# Patient Record
Sex: FEMALE | Race: WHITE | NOT HISPANIC OR LATINO | Employment: OTHER | URBAN - METROPOLITAN AREA
[De-identification: names, ages, dates, MRNs, and addresses within clinical notes are randomized per-mention and may not be internally consistent; named-entity substitution may affect disease eponyms.]

---

## 2017-01-06 ENCOUNTER — PATIENT MESSAGE (OUTPATIENT)
Dept: GASTROENTEROLOGY | Facility: CLINIC | Age: 42
End: 2017-01-06

## 2017-01-06 ENCOUNTER — TELEPHONE (OUTPATIENT)
Dept: GASTROENTEROLOGY | Facility: CLINIC | Age: 42
End: 2017-01-06

## 2017-01-06 DIAGNOSIS — K52.832 LYMPHOCYTIC COLITIS: Primary | ICD-10-CM

## 2017-01-06 RX ORDER — MERCAPTOPURINE 50 MG/1
25 TABLET ORAL 2 TIMES DAILY
Qty: 30 TABLET | Refills: 1 | Status: SHIPPED | OUTPATIENT
Start: 2017-01-06 | End: 2017-04-10

## 2017-01-06 NOTE — TELEPHONE ENCOUNTER
Called and spoke with pt  Explained message below.  She asked if I could mail the lab orders.   I told her yes and I would put a note reminding her to go to the [facility] in [place] 2 wks after she starts the 6mp.  She said that would be great  Thanked me for the call     Lab orders mailed along with a reminder note on when and where to go

## 2017-01-06 NOTE — TELEPHONE ENCOUNTER
----- Message from Jefry Willett MD sent at 1/6/2017 10:04 AM CST -----  Please schedule patient for CBC/LFTs at [facility] 2 weeks after she starts 6MP. Let her know I sent her email with plan based on her email today and 6MP has been sent to  pharmacy in [facility]

## 2017-01-22 ENCOUNTER — PATIENT MESSAGE (OUTPATIENT)
Dept: GASTROENTEROLOGY | Facility: CLINIC | Age: 42
End: 2017-01-22

## 2017-02-01 ENCOUNTER — TELEPHONE (OUTPATIENT)
Dept: GASTROENTEROLOGY | Facility: CLINIC | Age: 42
End: 2017-02-01

## 2017-02-01 NOTE — TELEPHONE ENCOUNTER
Called Griselda at [facility] and I explained the orders are still goo and OK to use.  I explained they are standing orders.  She expressed understanding and thanked me for my call

## 2017-02-01 NOTE — TELEPHONE ENCOUNTER
----- Message from Chyna Dav sent at 2/1/2017 11:58 AM CST -----  Contact: Griselda- Anu- [phone]  Chilo Villalobos called to get clarity on the pts orders for her cbc and hepatic function panel test- says they are from November and wanted to know if it was ok to still use them- please call Griselda back at [phone]

## 2017-02-02 ENCOUNTER — TELEPHONE (OUTPATIENT)
Dept: GASTROENTEROLOGY | Facility: CLINIC | Age: 42
End: 2017-02-02

## 2017-02-02 NOTE — TELEPHONE ENCOUNTER
.Outside Labs: [facility]    Date of labs:  2/1/17  Results: other lab results will be coming - per pt    Prealbumin 19      IBD MEDICATIONS CURRENTLY ON AND CURRENT DOSAGE (including Prednisone):  entocort 9mg po daily  Prednisone 10 mg po daily  6mp 25mg po bis      IF CURRENTLY ON A BIOLOGIC, LAST DOSE AND NEXT SCHEDULED DOSE:na    NEXT SCHEDULED APPOINTMENT: 4/10

## 2017-02-06 ENCOUNTER — TELEPHONE (OUTPATIENT)
Dept: GASTROENTEROLOGY | Facility: CLINIC | Age: 42
End: 2017-02-06

## 2017-02-06 ENCOUNTER — PATIENT MESSAGE (OUTPATIENT)
Dept: GASTROENTEROLOGY | Facility: CLINIC | Age: 42
End: 2017-02-06

## 2017-02-06 NOTE — TELEPHONE ENCOUNTER
----- Message from Jefry Willett MD sent at 2/6/2017  2:01 PM CST -----  Luisa  Can you get bloodwork from [facility] for Ms Byrnes- she said she had it done there last Wednesday but I have not received it    Thanks so much     SS

## 2017-02-07 NOTE — TELEPHONE ENCOUNTER
Emailed patient results and asked her to increase 6MP 25 mg BID with CBC/LFTs in 2 weeks. She will let us know once labs are done and make sure we receive. I don't plan to taper entocort until she is on appropriate dosing for at least 3 mos of 6MP    Next appt 4/10/16    SS

## 2017-02-07 NOTE — TELEPHONE ENCOUNTER
.Outside Labs: [facility]    Date of labs:2/1/17    Results:  Bun 11  Creatinine 0.40L 0.50-1.30  Na 144  k 3.6  Chloride 104  Protein, total 6.2  Albumin 3.4L 3.5-5.2  Bilirubin, total 0.3  Alkaline phos 52  ast 22  Alt 19    Wbc 15.3H 4.0-11.0  Rbc 4.09  hgb 12.2  hct 36.9  mcv 90.2  Mch 29.8  Mchc 33.1  Neutrophils 79.4H 40.0-74.0  Bands 10.3H 0.0-8.0  Lymphocytes 6.8L 19.0-48.0  Monocytes 2.5L 4.0-13.0  Nucleated rbc's 1H 0/100wbc  plt 313          IBD MEDICATIONS CURRENTLY ON AND CURRENT DOSAGE (including Prednisone):  Prednisone 10 mg po daily    IF CURRENTLY ON A BIOLOGIC, LAST DOSE AND NEXT SCHEDULED DOSE:  na  NEXT SCHEDULED APPOINTMENT: clinic 4/10

## 2017-02-09 ENCOUNTER — TELEPHONE (OUTPATIENT)
Dept: GASTROENTEROLOGY | Facility: CLINIC | Age: 42
End: 2017-02-09

## 2017-02-09 ENCOUNTER — PATIENT MESSAGE (OUTPATIENT)
Dept: GASTROENTEROLOGY | Facility: CLINIC | Age: 42
End: 2017-02-09

## 2017-02-22 ENCOUNTER — PATIENT MESSAGE (OUTPATIENT)
Dept: GASTROENTEROLOGY | Facility: CLINIC | Age: 42
End: 2017-02-22

## 2017-03-10 ENCOUNTER — TELEPHONE (OUTPATIENT)
Dept: GASTROENTEROLOGY | Facility: CLINIC | Age: 42
End: 2017-03-10

## 2017-03-10 NOTE — TELEPHONE ENCOUNTER
----- Message from Cristina Velairena sent at 3/10/2017  8:34 AM CST -----  Contact: my chart  Appointment Request From: Jewels Byrnes         With Provider: Jefry Willett MD [[facility]]        Would Accept With:Only the person I've selected        Preferred Date Range: From 4/12/2017 To 5/24/2017        Preferred Times: Wednesday Morning, Friday Morning, Wednesday Afternoon, Friday Afternoon        Reason for visit: Request an Appt        Comments:    I am scheduled for my follow up visit on a Monday in April. Do you have any appointments on a Wednesday or Friday as those are my days off of work? I can come at any time on a Wed or Friday (except the last week in April). Looking for an appointment in either April or May.        Thank you    Jewels Byrnes

## 2017-03-10 NOTE — TELEPHONE ENCOUNTER
Returned pts call  Left ms that dr montano only has  Clinic on mon, tue and thurs.   She is currently has 4/10 set  please advise if she doesn't want that appt  Direct line left

## 2017-04-10 ENCOUNTER — OFFICE VISIT (OUTPATIENT)
Dept: GASTROENTEROLOGY | Facility: CLINIC | Age: 42
End: 2017-04-10
Payer: MEDICARE

## 2017-04-10 VITALS
TEMPERATURE: 98 F | DIASTOLIC BLOOD PRESSURE: 66 MMHG | HEIGHT: 62 IN | BODY MASS INDEX: 18.78 KG/M2 | WEIGHT: 102.06 LBS | HEART RATE: 80 BPM | SYSTOLIC BLOOD PRESSURE: 94 MMHG | RESPIRATION RATE: 14 BRPM

## 2017-04-10 DIAGNOSIS — R10.9 ABDOMINAL CRAMPING: Primary | ICD-10-CM

## 2017-04-10 PROCEDURE — 99214 OFFICE O/P EST MOD 30 MIN: CPT | Mod: S$GLB,,, | Performed by: INTERNAL MEDICINE

## 2017-04-10 PROCEDURE — 1160F RVW MEDS BY RX/DR IN RCRD: CPT | Mod: S$GLB,,, | Performed by: INTERNAL MEDICINE

## 2017-04-10 PROCEDURE — 99999 PR PBB SHADOW E&M-EST. PATIENT-LVL IV: CPT | Mod: PBBFAC,,, | Performed by: INTERNAL MEDICINE

## 2017-04-10 RX ORDER — TRAMADOL HYDROCHLORIDE 50 MG/1
50 TABLET ORAL
COMMUNITY
End: 2021-01-01

## 2017-04-10 RX ORDER — DICYCLOMINE HYDROCHLORIDE 10 MG/1
10 CAPSULE ORAL
Qty: 60 CAPSULE | Refills: 3 | Status: SHIPPED | OUTPATIENT
Start: 2017-04-10 | End: 2017-05-10

## 2017-04-10 NOTE — MR AVS SNAPSHOT
[facility]  [address]  [place] [address]  Phone: [phone]  Fax: [phone]                  Jewels Byrnes   4/10/2017 3:00 PM   Office Visit    Description:  Female : 1975   Provider:  Jefry Willett MD   Department:  [facility]           Reason for Visit     lymphocytic colitis           Diagnoses this Visit        Comments    Abdominal cramping    -  Primary            To Do List           Goals (5 Years of Data)     None       These Medications        Disp Refills Start End    dicyclomine (BENTYL) 10 MG capsule 60 capsule 3 4/10/2017 5/10/2017    Take 1 capsule (10 mg total) by mouth 4 (four) times daily before meals and nightly. - Oral    Pharmacy: [facility] [place] [address] [address]  #: [phone]         [facility]     [facility] On Call Nurse Care Line -  Assistance  Unless otherwise directed by your provider, please contact [facility], our nurse care line that is available for  assistance.     Registered nurses in the [facility] provide: appointment scheduling, clinical advisement, health education, and other advisory services.  Call: [phone] (toll free)               Medications           Message regarding Medications     Verify the changes and/or additions to your medication regime listed below are the same as discussed with your clinician today.  If any of these changes or additions are incorrect, please notify your healthcare provider.        START taking these NEW medications        Refills    dicyclomine (BENTYL) 10 MG capsule 3    Sig: Take 1 capsule (10 mg total) by mouth 4 (four) times daily before meals and nightly.    Class: Normal    Route: Oral      STOP taking these medications     lidocaine-prilocaine (EMLA) cream APPLY A SMALL AMOUNT TOPICALLY AS NEEDED BEFORE PORT ACCESS    mercaptopurine (PURINETHOL) 50 mg tablet Take 0.5 tablets (25 mg total) by mouth 2 (two) times  "daily.           Verify that the below list of medications is an accurate representation of the medications you are currently taking.  If none reported, the list may be blank. If incorrect, please contact your healthcare provider. Carry this list with you in case of emergency.           Current Medications     atenolol (TENORMIN) 25 MG tablet Take 25 mg by mouth daily as needed. Only if needed for tachycardia    budesonide (ENTOCORT EC) 3 mg capsule Take 9 mg by mouth once daily. Takes 1 - 2 caps a day    ondansetron (ZOFRAN) 8 MG tablet Take 8 mg by mouth every 8 (eight) hours.    predniSONE (DELTASONE) 10 MG tablet Take 10 mg by mouth once daily. Use dose in AM prior to surgery    senna-docusate 8.6-50 mg (PERICOLACE) 8.6-50 mg per tablet Take 2 tablets by mouth daily as needed.     sildenafil (REVATIO) 20 mg tablet Take 20 mg by mouth every evening.     tramadol (ULTRAM) 50 mg tablet Take 50 mg by mouth every 6 (six) hours as needed for Pain.    dicyclomine (BENTYL) 10 MG capsule Take 1 capsule (10 mg total) by mouth 4 (four) times daily before meals and nightly.           Clinical Reference Information           Your Vitals Were     BP Pulse Temp Resp Height Weight    94/66 (BP Location: Left arm, Patient Position: Sitting) 80 98.2 °F (36.8 °C) 14 5' 2" (1.575 m) 46.3 kg (102 lb 1.2 oz)    BMI                18.67 kg/m2          Blood Pressure          Most Recent Value    BP  94/66      Allergies as of 4/10/2017     Sulfa (Sulfonamide Antibiotics)    Gluten Protein    Latex, Natural Rubber    Dairy Aid [Lactase]      Immunizations Administered on Date of Encounter - 4/10/2017     None      Instructions    Instructions:  - take bentyl 10 mg 30 min prior to lunch and again 30 min prior to dinner  - continue entocort 6 mg daily  - follow-up with Dr. Connors in 3 months        Language Assistance Services     ATTENTION: Language assistance services are available, free of charge. Please call [phone].  "     ATENCIÓN: Si habla español, tiene a telles disposición servicios gratuitos de asistencia lingüística. Romi al [phone].     CHÚ Ý: N?u b?n nói Ti?ng Vi?t, có các d?ch v? h? tr? ngôn ng? mi?n phí dành cho b?n. G?i s? [phone].         [facility] complies with applicable Federal civil rights laws and does not discriminate on the basis of race, color, national origin, age, disability, or sex.

## 2017-04-10 NOTE — LETTER
April 10, 2017        Yonatan Connors MD  [address] [facility] [address]  [place] [facility] [place] [address]             [facility]  [address]  [facility] [address]  Phone: [phone]  Fax: [phone]   Patient: Jewels Byrnes   MR Number: [id number]   YOB: 1975   Date of Visit: 4/10/2017       Dear Dr. Connors:    Thank you for referring Jewels Byrnes to me for evaluation. Attached you will find relevant portions of my assessment and plan of care.    If you have questions, please do not hesitate to call me. I look forward to following Jewels Byrnes along with you.    Sincerely,      Jefry Willett MD            CC  Chung Jeter MD    Enclosure

## 2017-04-10 NOTE — PROGRESS NOTES
"     [facility]             Inflammatory Bowel Disease Follow-up  Note            Dr. Jefry Willett  TODAY'S VISIT DATE:  4/10/2017    Chief Complaint:   Chief Complaint   Patient presents with    lymphocytic colitis     PCP: Chung Jeter    Previous History:  Jewels Byrnes is a 41 y.o. 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  She has had GI symptoms since 2005 with flex sig and colonoscopy showing findings suggestive of microscopic colitis and possible celiac disease.  She was not told about this diagnosis initially but later felt better when she tried a gluten free diet in 2012.  She was admitted to the hospital in 2006 for liver failure at which time she was thought to have  autoimmune pancreatitis with autoimmune hepatitis which was treated with IV steroids, 6MP and she had a cholecystectomy.  She did well on 6MP for about 5 years.  Repeat EGD in 2009 and 2011 showed normal duodenum.  She was hospitalized in approximately 0378-1069 for severe pulmonary HTN requiring treatment with cytoxan and her GI symptoms were manageable as the other symptoms including body pains were most significant. These symptoms improved in 2012 with gluten free diet.  Intermittently through the years she was given antibiotics for her GI symptoms which would work and usually doxycycline and flagyl. Colonoscopy 11/2012 showed tubular adenomas with colon biopsies showing reduction of goblet cells and increased IELS with some concerns for "autoimmune colitis".  She was treated for SIBO with rifaximin which helped. She had 2 episodes of C diff and after her 2nd episode (though this was not resistant to treatment) it was " suggested she should undergo fecal transplant. Though after this she had massive candida overgrowth and H pylori (self treated) and food intolerances.  Thereafter she tried entocort (effective), uceris (less effective), 5-ASA (hypersensivity reaction), imuran (severe vomiting?pancreatitis, though tolerated 6MP in past). She had taken prednisone for her scleroderma and when she initially saw me end of June 2016 she was on prednisone 20 mg daily for severe oral ulcers.  She was on TPN since 2/2016.  She took  marinol and for constipation takes sennokot for last 3 mos.  She reported significant RLQ abdominal pain and intermittent diarrhea which was better with entocort and marinol.  At the time she initially met me I wanted to assess for IBD, microscopic colitis, autoimmune enteropathy and celiac disease with testing though I though a large part of her symptoms were due to  systemic sclerosis which was contributing to her constipation, h/o SIBO and even collagen deposition on colon biopsies.  In June 2016 MRE showed some possible mild mural thickening of the cecum and proximal ascending colon. EGD and colonoscopy in 7/2016 showed normal EGD with biopsies negative for celiac disease and colonoscopy normal endoscopically with biopsies consistent with microscopic lymphocytic colitis. We did [place] anti-enterocyte abs which were negative.  During summer 2016, she increased entocort from 3 mg daily to 9 mg daily and after 2 mos her diarrhea resolved.  She has been off of TPN since end of 9/2016    Interval History:  - current IBD meds: entocort 6 mg daily  - 2/7/17 port removed due to infection  - tried 6MP 25 mg daily but when we increased to 50 mg daily she developed diarrhea, fatigue, exhaustion, nausea  - abdominal pain: daily, intermittent, valium helped, bentyl and tramadol help  - nausea with certain foods- takes zofran  - anxious/depressed  - 3/29/17 labs:  CMP normal with albumin 3.4, iron 110, iron sats 40%, CBC  normal (H/H 13.4/42.3)  - joint pains  - sleep disturbance- attributed to too much caffeine  - 1 soft to formed Bowel Movements/day    Prior Pertinent Endoscopy/Imaging:  3/31/2005 Flex Sig: colonic mucosa up to the distal transverse colon normal- biopsies of left and transverse colon show increased collagen and lymphocytic infiltrates are suggestive of a collagenous colitis; lymphocytic colitis must also be considered; rectum: mucosa with lymphocytic inflammation and focal superficial erosion  4/12/2005 Colonscopy: entire colon normal; biopsies of TI normal except some intraepithelial lymphocytosis and normal villous architecture; ascending/transverse/descendig/sigmoid colon normal with rectum with focal superificial erosion  5/3/2005 EGD: esophagus normal; non-bleeding erythematous gastropathy; normal examined duodenum  5/3/2005 Pathology EGD: duodenum mucosa with increased intraepithelial lymphocytes; stomach: chronic inactive gastritis, negative for helicobactor; changes present in the duodenum are suggestive of celiac sprue??  12/2/2009 Pathology EGD: Duodenum unremarkable; stomach-mild reactive gastropathy; gastroesophageal junction: lymphocytic esophagitis pattern of injury  10/3/2011 EGD: tortuous esophagus; normal stomach; erythematous duodenopathy; biopsies of duodenal mucosa partially denuded; candida esophagitis (PAS positive)  11/7/2012 Colonoscopy: Two 3-4 mm polyps in cecum resected (tubular adenoma) with adjacent mucosa with marked reduction of goblet cells and increased IELs; ileal mucosa with abscence of goblet and paneth cells; random colon mucosa iwht marked reduction of goblet cells, crypt apoptosis, increase intraepithelial lymphocytes though no inflammation; pseudopyloric metaplasia or epithelioid granulomas identified; findings most likely represent autoimmune colitis; confirmatory antienterocyte and antigoblet cell studies are recommended  7/11/2013 Colonoscopy: normal colon; terminal ileum  normal; random colon biopsies compatible with autoimmune colitis  5/19/2015 Colonoscopy with biopsy and fecal transplantation: mild decreased vascular patterning throughout colon, particularly on the left side. No overt pseudomembranes or obvious C. Diff colitis on Vancomycin and Flagyl at time of scope). Cecum biopsy normal, ascending colon normal, random distal colon-mild chronic non-specific colitis; Biopsies of cecum/ascending colon normal with random distal colon biopsies showing mild chronic non-specific colitis  1/26/2016 CT Scan: colonic air fluids levels, nonspecific-may reflect ileus or enterocolitis; no colonic mucosal thickening, mild indistinctness of the contour of the head of the pancreas with minimal stranding of the surrounding soft tissues (mild acute pancreatitis)  2/18/2016 CXR: subtle opacity of the right middle lobe concerning for developing pneumonia    Prior Pertinent Labs:  1/26/2016-C. Diff negative  1/14/2016-Stool Cultures negative    Pertinent Labs:  Lab Results   Component Value Date    STOOLCULTURE  07/01/2016     No Salmonella,Shigella,Vibrio,Campylobacter,Yersinia isolated.    YBFINQVNDC2Q Negative 07/01/2016    PZYDRHAKMH6C Negative 07/01/2016    CDIFFICILEAN Negative 07/01/2016    CDIFFTOX Negative 07/01/2016     Lab Results   Component Value Date    CRP 0.2 06/16/2016     No results found for: KHZFUZPL78ES  No results found for: HEPBIGM, HEPBCAB, HEPBSURFABQU  No results found for: VARICELLAZOS, VARICELLAINT  No results found for: NIL, TBAG, TBAGNIL, MITOGENNIL, TBGOLD  Lab Results   Component Value Date    TPMTRESULT 23.9 (L) 06/16/2016     No results found for: ANSADAINIT, INFLIXIMAB, INFLIXINTERP    Prior treatments for autoimmune disease/GI issues  Sulfasalazine- ineffective  apriso, pentasa, asacol- hypersensitivity reaction  Entocort- effective  Uceris- ineffective  Prednisone- effective  6MP- effective for 5 years, retrial- diarrhea, fatigue, exhaustion, nausea  Imuran-  ?pancreatitis  Antibiotics- effective     Current Therapies:  Prednisone 10 mg daily  Entocort 6 mg daily    NSAID use/indication:  No    Narcotic use:  No    Alternative/Complementary Meds for IBD:  Tumeric, hemp oil      Review of Systems   Constitutional: Negative for chills, diaphoresis, fever and weight loss.        Weight gain   HENT:        No oral ulcers, dysphagia, oral thrush   Eyes: Negative for blurred vision, pain and redness.   Respiratory: Negative for cough and shortness of breath.    Cardiovascular: Negative for chest pain.   Gastrointestinal: Positive for abdominal pain. Negative for blood in stool, constipation, diarrhea, heartburn, melena, nausea and vomiting.   Genitourinary: Negative for dysuria and hematuria.   Musculoskeletal: Positive for joint pain (joint stiffness). Negative for back pain.   Skin: Negative for rash.   Endo/Heme/Allergies: Does not bruise/bleed easily.   Psychiatric/Behavioral: Positive for depression. The patient is nervous/anxious. The patient does not have insomnia.      All Medical History/Surgical History/Family History/Social History/Allergies have been reviewed and updated in EMR    Review of patient's allergies indicates:   Allergen Reactions    Sulfa (sulfonamide antibiotics) Anaphylaxis    Gluten protein Other (See Comments)     Celiac and crohn's     Latex, natural rubber Hives    Dairy aid [lactase] Rash       Outpatient Prescriptions Marked as Taking for the 4/10/17 encounter (Office Visit) with Jefry Willett MD   Medication Sig Dispense Refill    atenolol (TENORMIN) 25 MG tablet Take 25 mg by mouth daily as needed. Only if needed for tachycardia      budesonide (ENTOCORT EC) 3 mg capsule Take 9 mg by mouth once daily. Takes 1 - 2 caps a day      ondansetron (ZOFRAN) 8 MG tablet Take 8 mg by mouth every 8 (eight) hours.      predniSONE (DELTASONE) 10 MG tablet Take 10 mg by mouth once daily. Use dose in AM prior to surgery      senna-docusate 8.6-50  mg (PERICOLACE) 8.6-50 mg per tablet Take 2 tablets by mouth daily as needed.       sildenafil (REVATIO) 20 mg tablet Take 20 mg by mouth every evening.       tramadol (ULTRAM) 50 mg tablet Take 50 mg by mouth every 6 (six) hours as needed for Pain.         Vital Signs:  BP: 94/66 Temp: 98.2 °F (36.8 °C) Pulse: 80 (ox:98) Resp: 14  Weight: 46.3 kg (102 lb 1.2 oz)    Physical Exam   Constitutional: She is oriented to person, place, and time. She appears well-developed.   HENT:   Mouth/Throat: Oropharynx is clear and moist. No oral lesions.   Eyes: Conjunctivae are normal. Pupils are equal, round, and reactive to light.   Neck:   Left upper chest- port in place   Cardiovascular: Normal rate and regular rhythm.    Pulmonary/Chest: Effort normal and breath sounds normal.   Abdominal: Soft. There is no tenderness.   Neurological: She is alert and oriented to person, place, and time.   Skin: No rash noted.   Psychiatric: She has a normal mood and affect.   Nursing note and vitals reviewed.      Labs:  Lab Results   Component Value Date    STOOLCULTURE  07/01/2016     No Salmonella,Shigella,Vibrio,Campylobacter,Yersinia isolated.    FOFKISLQLT0D Negative 07/01/2016    FGBJBOVOSI5K Negative 07/01/2016    CDIFFICILEAN Negative 07/01/2016    CDIFFTOX Negative 07/01/2016     Lab Results   Component Value Date    WBC 8.90 06/23/2016    HGB 12.1 06/23/2016    HCT 37.5 06/23/2016    MCV 98 06/23/2016     06/23/2016    ALT 53 (H) 06/23/2016    AST 39 (H) 06/23/2016    ALKPHOS 46 06/23/2016    BILITOT 0.4 06/23/2016    TSH 0.467 06/16/2016    FREET4 0.90 06/16/2016    SEDRATE 4 06/16/2016    CRP 0.2 06/16/2016    TTGIGA 7 06/16/2016     06/16/2016     No results found for: HEPBIGM, HEPBCAB, HEPBSAB, HEPBSURFABQU  No results found for: VARICELLAZOS, VARICELLAINT  No results found for: NIL, TBAG, TBAGNIL, MITOGENNIL, TBGOLD  Lab Results   Component Value Date    TPMTRESULT 23.9 (L) 06/16/2016     No results found for:  ANSADAINIT, INFLIXIMAB, INFLIXINTERP    Assessment/Plan:  Jewels Byrnes is a 41 y.o. 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 I have done a thorough evaluation and no evidence of Crohn's disease, autoimmune enteropathy, thyroid disease or celiac disease.  She has had an incredible effect from increasing her dose of entocort and now on 6 mg daily. Her symptoms are controlled with no diarrhea and she has been able to get off of TPN since 9/2016 and gained weight.  She had an infected port which was removed in 2/2017.  She continues on prednisone 10 mg daily in addition to entocort 6 mg daily. Overall I don't think risks or benefits of biologics for microscopic colitis will allow her to get off of her steroids.  I also think the risk of long term TPN compared to low dose prednisone is worse and her QOL is very important.  WE had a detailed discussion regarding long term risks of entocort and prednisone.  She failed a trial for 6MP and had significant SE to this    # Scleroderma with systemic sclerosis manifesting with GI symptoms  # Lymphocytic colitis  # Malnutrition- improved    Plan:  - continue entocort 6 mg daily and prednisone 10 mg daily  - pt understands risk of long term corticosteroids  - bone health- sees PCP/endocrinology- weight bearing exercise, calcium/vit D    Total visit time was 30 minutes, more than 50% of which was spent in face-to-face counseling with patient regarding evaluation and management goals and treatment options for lymphocytic colitis     Follow up: with Dr. Connors in 3 mos    Jefry Willett MD  Department of Gastroenterology  Medical Director, Inflammatory Bowel Disease

## 2017-04-14 DIAGNOSIS — K52.832 LYMPHOCYTIC COLITIS: ICD-10-CM

## 2017-04-16 RX ORDER — BUDESONIDE 3 MG/1
CAPSULE, COATED PELLETS ORAL
Qty: 60 CAPSULE | Refills: 3 | Status: SHIPPED | OUTPATIENT
Start: 2017-04-16 | End: 2018-12-04

## 2017-05-22 ENCOUNTER — PATIENT MESSAGE (OUTPATIENT)
Dept: GASTROENTEROLOGY | Facility: CLINIC | Age: 42
End: 2017-05-22

## 2017-06-14 ENCOUNTER — PATIENT MESSAGE (OUTPATIENT)
Dept: GASTROENTEROLOGY | Facility: CLINIC | Age: 42
End: 2017-06-14

## 2017-08-07 DIAGNOSIS — K52.832 LYMPHOCYTIC COLITIS: ICD-10-CM

## 2017-08-07 RX ORDER — BUDESONIDE 3 MG/1
CAPSULE, COATED PELLETS ORAL
Qty: 60 CAPSULE | Refills: 3 | OUTPATIENT
Start: 2017-08-07

## 2017-10-20 ENCOUNTER — TELEPHONE (OUTPATIENT)
Dept: NEUROSURGERY | Facility: CLINIC | Age: 42
End: 2017-10-20

## 2017-10-20 NOTE — TELEPHONE ENCOUNTER
Left message on patient voicemail with appointment date and time , also mailed out appointment letter

## 2018-07-19 ENCOUNTER — TELEPHONE (OUTPATIENT)
Dept: GASTROENTEROLOGY | Facility: CLINIC | Age: 43
End: 2018-07-19

## 2018-07-19 NOTE — TELEPHONE ENCOUNTER
Pls arrange new pt apt w me and victorino.  Ref from Dr. Vicente Watts.  PT w GI scleroderma. Previously seen by Dr. Willett.  Some records in media.  Pls get any new records (since apt w Dr. Willett) from primary GI.

## 2018-08-07 NOTE — TELEPHONE ENCOUNTER
Attempted to reach pt to obtain additional information in order to schedule with Dr. Watts.  No answer from patient.  Message left on voicemail for patient to return call to office.

## 2018-08-30 ENCOUNTER — TELEPHONE (OUTPATIENT)
Dept: GASTROENTEROLOGY | Facility: CLINIC | Age: 43
End: 2018-08-30

## 2018-08-30 NOTE — TELEPHONE ENCOUNTER
----- Message from Chyna Demarco sent at 8/29/2018  8:49 AM CDT -----  Contact: Self- [phone]  Arnulfomi- pt called to schedule an ep appt- previously seen by Dr. KRUPA Willett- would like to rule out chron's pt has scleroderma please contact pt at [phone]

## 2018-08-31 ENCOUNTER — TELEPHONE (OUTPATIENT)
Dept: GASTROENTEROLOGY | Facility: CLINIC | Age: 43
End: 2018-08-31

## 2018-08-31 NOTE — TELEPHONE ENCOUNTER
----- Message from Chyna Demarco sent at 8/31/2018  9:54 AM CDT -----  Contact: Self- [phone]  Stefanie- pt is returning a call in regards to scheduling an appt with Dr. Watts- please contact pt at [phone]

## 2018-09-12 ENCOUNTER — TELEPHONE (OUTPATIENT)
Dept: GASTROENTEROLOGY | Facility: CLINIC | Age: 43
End: 2018-09-12

## 2018-09-13 ENCOUNTER — TELEPHONE (OUTPATIENT)
Dept: GASTROENTEROLOGY | Facility: CLINIC | Age: 43
End: 2018-09-13

## 2018-09-13 NOTE — TELEPHONE ENCOUNTER
----- Message from Chyna Demarco sent at 9/13/2018  9:57 AM CDT -----  Contact: Self- [phone]  Romina luna is returning a missed call from Radha- please contact pt at [phone]

## 2018-09-24 ENCOUNTER — OFFICE VISIT (OUTPATIENT)
Dept: URGENT CARE | Facility: CLINIC | Age: 43
End: 2018-09-24
Payer: MEDICARE

## 2018-09-24 VITALS
DIASTOLIC BLOOD PRESSURE: 67 MMHG | RESPIRATION RATE: 16 BRPM | TEMPERATURE: 97 F | OXYGEN SATURATION: 97 % | HEIGHT: 62 IN | HEART RATE: 88 BPM | BODY MASS INDEX: 15.27 KG/M2 | WEIGHT: 83 LBS | SYSTOLIC BLOOD PRESSURE: 92 MMHG

## 2018-09-24 DIAGNOSIS — K12.1 MOUTH ULCERS: ICD-10-CM

## 2018-09-24 DIAGNOSIS — H66.005 RECURRENT ACUTE SUPPURATIVE OTITIS MEDIA WITHOUT SPONTANEOUS RUPTURE OF LEFT TYMPANIC MEMBRANE: Primary | ICD-10-CM

## 2018-09-24 DIAGNOSIS — J02.9 SORE THROAT: ICD-10-CM

## 2018-09-24 LAB
CTP QC/QA: YES
S PYO RRNA THROAT QL PROBE: NEGATIVE

## 2018-09-24 PROCEDURE — 87880 STREP A ASSAY W/OPTIC: CPT | Mod: QW,S$GLB,, | Performed by: PHYSICIAN ASSISTANT

## 2018-09-24 PROCEDURE — 3008F BODY MASS INDEX DOCD: CPT | Mod: CPTII,S$GLB,, | Performed by: PHYSICIAN ASSISTANT

## 2018-09-24 PROCEDURE — 99204 OFFICE O/P NEW MOD 45 MIN: CPT | Mod: S$GLB,,, | Performed by: PHYSICIAN ASSISTANT

## 2018-09-24 RX ORDER — TIZANIDINE 4 MG/1
TABLET ORAL
COMMUNITY
Start: 2018-08-21 | End: 2018-12-04

## 2018-09-24 RX ORDER — POTASSIUM CHLORIDE 1500 MG/1
TABLET, EXTENDED RELEASE ORAL
COMMUNITY
Start: 2018-09-07 | End: 2018-12-04

## 2018-09-24 RX ORDER — AMOXICILLIN 500 MG/1
500 CAPSULE ORAL EVERY 12 HOURS
Qty: 20 CAPSULE | Refills: 0 | Status: SHIPPED | OUTPATIENT
Start: 2018-09-24 | End: 2018-10-04

## 2018-09-24 RX ORDER — HYDROCODONE BITARTRATE AND ACETAMINOPHEN 5; 325 MG/1; MG/1
TABLET ORAL
Refills: 0 | COMMUNITY
Start: 2018-08-20 | End: 2018-12-04

## 2018-09-24 RX ORDER — ONDANSETRON 4 MG/1
4 TABLET, ORALLY DISINTEGRATING ORAL
Refills: 0 | COMMUNITY
Start: 2018-06-26 | End: 2021-01-01

## 2018-09-24 RX ORDER — DIAZEPAM 5 MG/1
TABLET ORAL
COMMUNITY
Start: 2018-09-06 | End: 2021-01-01 | Stop reason: CLARIF

## 2018-09-24 NOTE — PROGRESS NOTES
"Subjective:       Patient ID: Jewels Byrnes is a 43 y.o. female.    Vitals:    09/24/18 1509   BP: 92/67   Pulse: 88   Resp: 16   Temp: 97.1 °F (36.2 °C)   SpO2: 97%   Weight: 37.6 kg (83 lb)   Height: 5' 2" (1.575 m)       Chief Complaint: Sore Throat    Patient reports sore throat for 3 days.      Sore Throat    This is a new problem. Episode onset: 3 days. The problem has been gradually worsening. The pain is worse on the left side. There has been no fever. Associated symptoms include ear pain (left), a plugged ear sensation and trouble swallowing. Pertinent negatives include no abdominal pain, congestion, coughing, headaches, hoarse voice or shortness of breath. She has had no exposure to strep or mono. Treatments tried: gentil violet. The treatment provided no relief.     Review of Systems   Constitution: Positive for malaise/fatigue. Negative for chills and fever.        Body aches   HENT: Positive for ear pain (left), sore throat and trouble swallowing. Negative for congestion and hoarse voice.         Mouth ulcers   Eyes: Negative for discharge and redness.   Cardiovascular: Positive for palpitations. Negative for chest pain, dyspnea on exertion and leg swelling.   Respiratory: Negative for cough, shortness of breath, sputum production and wheezing.    Musculoskeletal: Negative for myalgias.   Gastrointestinal: Negative for abdominal pain and nausea.   Neurological: Negative for headaches.       Objective:      Physical Exam   Constitutional: She is oriented to person, place, and time. She appears well-developed and well-nourished. She is cooperative.  Non-toxic appearance. She does not appear ill. No distress.   HENT:   Head: Normocephalic and atraumatic.   Right Ear: Hearing, tympanic membrane, external ear and ear canal normal.   Left Ear: Hearing, external ear and ear canal normal. Tympanic membrane is erythematous and bulging.   Nose: Nose normal. No mucosal edema, rhinorrhea or nasal deformity. No " Signed, please fax and send for scanning.  Placed in TC/MA file.  Electronically signed by Yumiko Ralph M.D.     epistaxis. Right sinus exhibits no maxillary sinus tenderness and no frontal sinus tenderness. Left sinus exhibits no maxillary sinus tenderness and no frontal sinus tenderness.   Mouth/Throat: Uvula is midline and mucous membranes are normal. Oral lesions (multiple ulcers on the tongue noted) present. No trismus in the jaw. Normal dentition. No uvula swelling. Posterior oropharyngeal erythema present.   Eyes: Conjunctivae and lids are normal. No scleral icterus.   Sclera clear bilat   Neck: Trachea normal, full passive range of motion without pain and phonation normal. Neck supple.   Cardiovascular: Normal rate, regular rhythm, normal heart sounds, intact distal pulses and normal pulses.   Pulmonary/Chest: Effort normal and breath sounds normal. No respiratory distress. She has no wheezes.   Abdominal: Soft. Normal appearance and bowel sounds are normal. She exhibits no distension. There is no tenderness.   Musculoskeletal: Normal range of motion. She exhibits no edema or deformity.   Neurological: She is alert and oriented to person, place, and time. She exhibits normal muscle tone. Coordination normal.   Skin: Skin is warm, dry and intact. She is not diaphoretic. No pallor.   Psychiatric: She has a normal mood and affect. Her speech is normal and behavior is normal. Judgment and thought content normal. Cognition and memory are normal.   Nursing note and vitals reviewed.      Assessment:       1. Recurrent acute suppurative otitis media without spontaneous rupture of left tympanic membrane    2. Sore throat    3. Mouth ulcers        Plan:       Jewels was seen today for sore throat.    Diagnoses and all orders for this visit:    Recurrent acute suppurative otitis media without spontaneous rupture of left tympanic membrane  -     amoxicillin (AMOXIL) 500 MG capsule; Take 1 capsule (500 mg total) by mouth every 12 (twelve) hours. for 10 days    Sore throat  -     POCT rapid strep A (negative)    Mouth ulcers  -      (Magic mouthwash) 1:1:1 Benadryl 12.5mg/5ml liq, aluminum & magnesium hydroxide-simehticone (Maalox), lidocaine viscous 2%; Swish and spit 5 mLs every 4 (four) hours as needed.      Other orders        Sore throat likely referred pain from left OM. Patient is already on steroids.  I discussed with the patient the importance of follow up if their symptoms have not resolved in 1-2 week's time. Patient verbalized understanding and will RTC or go to the nearest ER if symptoms persist or worsen.

## 2018-10-03 ENCOUNTER — OFFICE VISIT (OUTPATIENT)
Dept: PAIN MEDICINE | Facility: CLINIC | Age: 43
End: 2018-10-03
Attending: ANESTHESIOLOGY
Payer: MEDICARE

## 2018-10-03 DIAGNOSIS — M79.10 MYALGIA: Primary | ICD-10-CM

## 2018-10-03 DIAGNOSIS — M62.838 CERVICAL PARASPINAL MUSCLE SPASM: ICD-10-CM

## 2018-10-03 DIAGNOSIS — G89.4 CHRONIC PAIN DISORDER: ICD-10-CM

## 2018-10-03 DIAGNOSIS — M47.22 OSTEOARTHRITIS OF SPINE WITH RADICULOPATHY, CERVICAL REGION: ICD-10-CM

## 2018-10-03 DIAGNOSIS — M50.30 DDD (DEGENERATIVE DISC DISEASE), CERVICAL: ICD-10-CM

## 2018-10-03 PROCEDURE — 99212 OFFICE O/P EST SF 10 MIN: CPT | Mod: PBBFAC | Performed by: ANESTHESIOLOGY

## 2018-10-03 PROCEDURE — 99204 OFFICE O/P NEW MOD 45 MIN: CPT | Mod: 25,S$PBB,, | Performed by: ANESTHESIOLOGY

## 2018-10-03 PROCEDURE — 20552 NJX 1/MLT TRIGGER POINT 1/2: CPT | Mod: S$PBB,,, | Performed by: ANESTHESIOLOGY

## 2018-10-03 PROCEDURE — 99999 PR PBB SHADOW E&M-EST. PATIENT-LVL II: CPT | Mod: PBBFAC,,, | Performed by: ANESTHESIOLOGY

## 2018-10-03 PROCEDURE — 20552 NJX 1/MLT TRIGGER POINT 1/2: CPT | Mod: PBBFAC | Performed by: ANESTHESIOLOGY

## 2018-10-03 RX ORDER — METHYLPREDNISOLONE ACETATE 40 MG/ML
40 INJECTION, SUSPENSION INTRA-ARTICULAR; INTRALESIONAL; INTRAMUSCULAR; SOFT TISSUE ONCE
Status: COMPLETED | OUTPATIENT
Start: 2018-10-03 | End: 2018-10-03

## 2018-10-03 RX ORDER — BUPIVACAINE HYDROCHLORIDE 2.5 MG/ML
2 INJECTION, SOLUTION EPIDURAL; INFILTRATION; INTRACAUDAL ONCE
Status: COMPLETED | OUTPATIENT
Start: 2018-10-03 | End: 2018-10-03

## 2018-10-03 RX ADMIN — BUPIVACAINE HYDROCHLORIDE 5 MG: 2.5 INJECTION, SOLUTION EPIDURAL; INFILTRATION; INTRACAUDAL; PERINEURAL at 03:10

## 2018-10-03 RX ADMIN — METHYLPREDNISOLONE ACETATE 40 MG: 40 INJECTION, SUSPENSION INTRA-ARTICULAR; INTRALESIONAL; INTRAMUSCULAR; SOFT TISSUE at 03:10

## 2018-10-03 NOTE — PROGRESS NOTES
Subjective:     Patient ID: Jewels Byrnes is a 43 y.o. female.    Chief Complaint: Pain    Consulted by: Self     Disclaimer: This note was generated using voice recognition software.  There may be a typographical errors that were missed during proofreading.      HPI:    Jewels Byrnes is a 43 y.o. female who presents today with chronic neck pain.  Pain started 5 + years ago.  It is on the left side of her neck.  It is a deep, aching pain that becomes sharp with movement, especially ipsilateral.  This occasionally radiates into her left arm/biceps that causes temporary muscle atrophy.  Stretching, traction, and ice help.  Bending, rotation aggravates.  This pain is described in detail below.    Physical Therapy: Yes    Non-pharmacologic Treatment:     · Ice/Heat: Ice  · TENS: No  · Massage: Yes  · Chiropractic care: No  · Acupuncture: No  · Other: No         Pain Medications:         · Currently taking: tramadol 50 mg daily PRN, Norco 5/325 mg daily PRN    · Has tried in the past:  NSAIDs, but she tried to avoid these due to her other medical conditions, tizanidine 4 mg (caused itching), gabapentin (too sedating)    · Has not tried: NSAIDs, Tylenol, other muscle relaxants (methocarmabol, flexeril), TCAs, SNRIs, topical creams    Blood thinners: None    Interventional Therapies:   · None for her neck  · Left L5 TFESI    Relevant Surgeries: Lumbar laminectomy right sided L5/S1    Affecting sleep? Yes    Affecting daily activities? Yes, she is not able to do ballet    Depressive symptoms? None          · SI/HI? No    Work status: Employed as a PT      Prescription Monitoring Program database:  Reviewed and consistent with medication use as prescribed.    No flowsheet data found.    Opioid Risk Score     None          GENERAL:  No weight loss, malaise or fevers.  HEENT:   No recent changes in vision or hearing  NECK:  Negative for lumps, no difficulty with swallowing.  RESPIRATORY:  Negative for cough, wheezing or  shortness of breath, patient denies any recent URI.  CARDIOVASCULAR:  Negative for chest pain, leg swelling or palpitations.  GI:  Negative for abdominal discomfort, blood in stools or black stools or change in bowel habits.  MUSCULOSKELETAL:  See HPI.  SKIN:  Negative for lesions, rash, and itching.  PSYCH:  No mood disorder or recent psychosocial stressors.  Patients sleep is not disturbed secondary to pain.  HEMATOLOGY/LYMPHOLOGY:  Negative for prolonged bleeding, bruising easily or swollen nodes.  Patient is not currently taking any anti-coagulants  ENDO: No history of diabetes or thyroid dysfunction  NEURO:   No history of headaches, syncope, paralysis, seizures or tremors.  All other reviewed and negative other than HPI.          Past Medical History:   Diagnosis Date    Acute pancreatitis     autoimmune pancreatitis    Anemia     Iron infusion- Dr. Vizcaino    C. difficile colitis 2013    x 2     Celiac disease     Cholelithiasis     gallbladder removed 2006    Colon polyp     Food intolerance     Hepatitis     autoimmune hepatitis     Interstitial lung disease due to connective tissue disease     Pulmonary artery hypertension     Scleroderma        Past Surgical History:   Procedure Laterality Date    APPENDECTOMY  1984    BACK SURGERY  08/2010    COLONOSCOPY  2015    COLONOSCOPY N/A 7/13/2016    Procedure: COLONOSCOPY;  Surgeon: Jefry Willett MD;  Location: [facility] ([address] [place] [address]);  Service: Endoscopy;  Laterality: N/A;    COLONOSCOPY N/A 7/13/2016    Performed by Jefry Willett MD at [facility] (4TH FLR)    ESOPHAGOGASTRODUODENOSCOPY (EGD) N/A 7/13/2016    Performed by Jefry Willett MD at [facility] (4TH FLR)    fecal transplant  2015    gallbladder      HYSTERECTOMY  03/2010    salpingo oophrectomy    [id number]-D-CATH Left 6/29/2016    Performed by Pavan Jose [facility] at [facility] OR    LIVER BIOPSY      PERIPHERALLY INSERTED CENTRAL CATHETER INSERTION      right shoulder surgery       UPPER GASTROINTESTINAL ENDOSCOPY  2015       Review of patient's allergies indicates:   Allergen Reactions    Sulfa (sulfonamide antibiotics) Anaphylaxis    Gluten protein Other (See Comments)     Celiac and crohn's     Latex, natural rubber Hives    Dairy aid [lactase] Rash       Current Outpatient Medications   Medication Sig Dispense Refill    (Magic mouthwash) 1:1:1 Benadryl 12.5mg/5ml liq, aluminum & magnesium hydroxide-simehticone (Maalox), lidocaine viscous 2% Swish and spit 5 mLs every 4 (four) hours as needed. 90 mL 0    amoxicillin (AMOXIL) 500 MG capsule Take 1 capsule (500 mg total) by mouth every 12 (twelve) hours. for 10 days 20 capsule 0    atenolol (TENORMIN) 25 MG tablet Take 25 mg by mouth daily as needed. Only if needed for tachycardia      budesonide (ENTOCORT EC) 3 mg capsule Take 9 mg by mouth once daily. Takes 1 - 2 caps a day      budesonide (ENTOCORT EC) 3 mg capsule TAKE 2 CAPSULES BY MOUTH ONCE EVERY DAY 60 capsule 3    diazePAM (VALIUM) 5 MG tablet       HYDROcodone-acetaminophen (NORCO) 5-325 mg per tablet   0    KLOR-CON M20 20 mEq tablet       ondansetron (ZOFRAN) 8 MG tablet Take 8 mg by mouth every 8 (eight) hours.      ondansetron (ZOFRAN-ODT) 4 MG TbDL   0    predniSONE (DELTASONE) 10 MG tablet Take 10 mg by mouth once daily. Use dose in AM prior to surgery      senna-docusate 8.6-50 mg (PERICOLACE) 8.6-50 mg per tablet Take 2 tablets by mouth daily as needed.       sildenafil (REVATIO) 20 mg tablet Take 20 mg by mouth every evening.       tiZANidine (ZANAFLEX) 4 MG tablet       tramadol (ULTRAM) 50 mg tablet Take 50 mg by mouth every 6 (six) hours as needed for Pain.       No current facility-administered medications for this visit.        Family History   Problem Relation Age of Onset    No Known Problems Mother     Heart disease Father     Cancer Father         prostate    Heart disease Maternal Grandmother     Heart disease Maternal Uncle     Crohn's  disease Cousin     Anesthesia problems Neg Hx     Clotting disorder Neg Hx     Multiple sclerosis Neg Hx     Tuberculosis Neg Hx     Scleroderma Neg Hx     Lymphoma Neg Hx     Lupus Neg Hx        Social History     Socioeconomic History    Marital status: Single     Spouse name: Not on file    Number of children: Not on file    Years of education: Not on file    Highest education level: Not on file   Social Needs    Financial resource strain: Not on file    Food insecurity - worry: Not on file    Food insecurity - inability: Not on file    Transportation needs - medical: Not on file    Transportation needs - non-medical: Not on file   Occupational History    Occupation: physical therapist   Tobacco Use    Smoking status: Never Smoker    Smokeless tobacco: Never Used   Substance and Sexual Activity    Alcohol use: Yes     Comment: 1-2 glass of wine every 1-2 weeks    Drug use: No    Sexual activity: Not on file   Other Topics Concern    Not on file   Social History Narrative    Works as a pt       Objective:     There were no vitals filed for this visit.    GEN:  Well developed, well nourished.  No acute distress.  No pain behavior.  HEENT:  No trauma.  Mucous membranes moist.  Nares patent bilaterally.  PSYCH: Normal affect. Thought content appropriate.  CHEST:  Breathing symmetric.  No audible wheezing.  ABD: Soft, non-distended.  SKIN:  Warm, pink, dry.  No rash on exposed areas.    EXT:  No cyanosis, clubbing, or edema.  No color change or changes in nail or hair growth.  NEURO/MUSCULOSKELETAL:  Fully alert, oriented, and appropriate. Speech normal jan. No cranial nerve deficits.   Gait: Normal.  5/5 motor strength throughout upper extremities.   Sensory: Negative sensory deficit in the upper extremities.   C-Spine:  Decreased ROM with pain on left lateral rotation and side bending. Positive facet loading on the left.  Positive Spurling's on the left in a C5 distribution  TTP over left  cervical facet joints and cervical paraspinal muscles.  Muscle atrophy noted in lateral head of the biceps on the left.      Imaging:      The imaging studies listed below were independently reviewed by me, and I agree with the findings as documented below.     MRI cervical spine dated 10/03/2017    Findings:  4 mm or so treating developmental borderline normal mild cerebellar tonsillar ectopia incidentally noted. No remarkable findings referable to the craniocervical junction or C1 level otherwise.  The odontoid is intact with normal C1-2 alignment.  There is slight reversal of the usual cervical lordosis centered at C4/5 disc level.  Cervical vertebral body heights and disc spaces well preserved.  Generalized mild-moderate cervical disc dehydration.    C2/3 Level:  Moderately dehydrated disc.  Moderately advanced left-sided hypertrophic facet joint arthropathy with associated small joint effusion contributory to moderate to severe range left-sided foraminal stenosis.  See left oblique slice 16, axial images 1-3.  No significant central canal or right-sided foraminal narrowing.    C3/4:  Moderately dehydrated disc.  Slightly asymmetric left posterior lateral disc margin bony ridging or spurring.  Moderate left-sided hypertrophic facet joint arthropathy.  Findings contributory to moderate to severe range left-sided foraminal stenosis of less or prominence.  See left oblique images 16-17, axial places 4-6.  No significant central canal right-sided foraminal narrowing.    C4/5:  Comparatively mild disc dehydration with mm tiny midline focal posterior central disc protrusion.  Moderate left-sided facet joint arthropathy, findings contributory to mild narrowing of the left as compared to the right neural foramen.  See axial places 7-9.    C5/6 level:  Diffuse mild circumferential bulging of a similarly dehydrated disc with contiguous marginal lipping or spurring contributory to mild central canal narrowing with subtotal  effacement of the anterior subarachnoid space and minimal extrinsic impression upon the anterior cord surface.  Asymmetrically prominent the relatively mild left-sided foraminal narrowing.  See axial images 10-14.    C6-7:  Similar diffuse mild circumferential disc bulge of a dehydrated disc with contiguous mild marginal lipping or spurring.  1-2 mm midline focal posterior disc protrusion/subligamentous herniation in combination with mild thickening or hypertrophy of the ligamentum flavum contributory to relatively mild degree central canal stenosis with subtotal effacement of the anterior/posterior subarachnoid space, mild cord deformity and compression, slightly eccentric to the right.  Mild facet joint arthropathy.  Mild foraminal narrowing.  See axial places 15-17.    C7/T1:  Mild disc dehydration.  Mild facet arthropathy.  No significant central canal or foraminal narrowing.  See axial images 18-20.    Impression  · Developmental borderline normal mild cerebellar tonsillar ectopia.  · Multilevel the relatively mild degenerative disc disease, most advanced at C5/6, C6/7, with 1-2 mm midline focal posterior central C6/7 level subligamentous herniation, slightly eccentric to the right.  More subtle tiny midline focal posterior central herniation C4/5 level disc protrusion.  · Multilevel, in particular asymmetrically prominent moderately advanced left-sided C2-3 through C4/5 level hypertrophic facet joint arthropathy.  · Above contributory in particular to asymmetrically prominent moderate to severe range left-sided C2/3, C3/4 level foraminal stenosis, more prominent at C2/C3 level. Relatively mild C5/6, C6/7 level central canal stenosis with associated mild cord deformity, more prominent at the C6-7 level    Assessment:     Encounter Diagnoses   Name Primary?    Chronic pain disorder     Myalgia Yes    Osteoarthritis of spine with radiculopathy, cervical region     DDD (degenerative disc disease), cervical      Cervical paraspinal muscle spasm        Plan:     Diagnoses and all orders for this visit:    Myalgia  -     bupivacaine (PF) 0.25% (2.5 mg/ml) injection 5 mg; Inject 2 mLs (5 mg total) into the muscle once.  -     methylPREDNISolone acetate injection 40 mg; Inject 1 mL (40 mg total) into the muscle once.    Chronic pain disorder    Osteoarthritis of spine with radiculopathy, cervical region    DDD (degenerative disc disease), cervical    Cervical paraspinal muscle spasm         Her pain is consistent with the above.    We discussed the assessment and recommendations.  All available images were reviewed. We discussed the disease process, prognosis, treatment plan, and risks and benefits. The patient is aware of the risks and benefits of the medications being prescribed, common side effects, and proper usage. The following is the plan we agreed on:     1. Trigger point injection today as below  1. If good benefit, repeat p.r.n.  2. If limited or not durable benefit, which schedule for C2-5 medial branch blocks with steroids under fluoro on the left  3. Can consider radiofrequency ablation of future  2. If we were ever to do a cervical HAROLDO, I would send her to neurosurgery first.  3. Continue home medications  4. Continue home exercise program  5. Trial Pennsaid  6. RTC as needed.    Trigger Point Injection:   The procedure was discussed with the patient including complications of damage, bleeding, infection, and failure of pain relief.     All medications, allergies, and relevant histories were reviewed. No recent antibiotics or infections.  A time-out was taken to verify the correct patient, procedure, laterality, and appropriate medications/allergies.    Trigger points were identified by palpation and marked. CHG prep of sites done. A 27-gauge needle was advanced to the point of maximal tenderness, and 3mL of a mixture of 0.25% bupivacaine with Depo-Medrol 40 mg was injected after negative aspiration in a  fanlike distribution. All sites done in the same manner. Patient tolerated the procedure well and without complications. Sites injected included:  Left cervical paraspinal X1 (covering levels C2-5)     The patient tolerated the procedure well and was discharged in excellent condition.    Thank you for allowing me to participate in the care of this patient.   Please do not hesitate to call me at [phone] with any questions or concerns.    Christine Ford MD  10/03/2018     The above plan and management options were discussed at length with patient. Patient is in agreement with the above and verbalized understanding. It will be communicated with the referring physician via electronic record, fax, or mail.

## 2018-10-10 ENCOUNTER — TELEPHONE (OUTPATIENT)
Dept: PAIN MEDICINE | Facility: CLINIC | Age: 43
End: 2018-10-10

## 2018-10-10 DIAGNOSIS — M50.30 DDD (DEGENERATIVE DISC DISEASE), CERVICAL: ICD-10-CM

## 2018-10-10 DIAGNOSIS — M47.22 OSTEOARTHRITIS OF SPINE WITH RADICULOPATHY, CERVICAL REGION: Primary | ICD-10-CM

## 2018-10-10 NOTE — TELEPHONE ENCOUNTER
Please contact her to set up a left C2-5 medial branch block with steroids with IV sedation for 10/26/2018.  Right lateral position.    Thanks!  LW

## 2018-11-28 ENCOUNTER — PATIENT MESSAGE (OUTPATIENT)
Dept: GASTROENTEROLOGY | Facility: CLINIC | Age: 43
End: 2018-11-28

## 2018-12-04 ENCOUNTER — TELEPHONE (OUTPATIENT)
Dept: GASTROENTEROLOGY | Facility: CLINIC | Age: 43
End: 2018-12-04

## 2018-12-04 ENCOUNTER — TELEPHONE (OUTPATIENT)
Dept: ENDOSCOPY | Facility: HOSPITAL | Age: 43
End: 2018-12-04

## 2018-12-04 ENCOUNTER — OFFICE VISIT (OUTPATIENT)
Dept: GASTROENTEROLOGY | Facility: CLINIC | Age: 43
End: 2018-12-04
Payer: MEDICARE

## 2018-12-04 ENCOUNTER — HOSPITAL ENCOUNTER (OUTPATIENT)
Facility: HOSPITAL | Age: 43
Discharge: HOME | End: 2018-12-05
Attending: EMERGENCY MEDICINE | Admitting: HOSPITALIST
Payer: MEDICARE

## 2018-12-04 VITALS
HEIGHT: 62 IN | DIASTOLIC BLOOD PRESSURE: 67 MMHG | BODY MASS INDEX: 14.56 KG/M2 | WEIGHT: 79.13 LBS | HEART RATE: 80 BPM | SYSTOLIC BLOOD PRESSURE: 94 MMHG

## 2018-12-04 DIAGNOSIS — F41.9 ANXIETY AND DEPRESSION: ICD-10-CM

## 2018-12-04 DIAGNOSIS — E87.6 HYPOKALEMIA: ICD-10-CM

## 2018-12-04 DIAGNOSIS — F32.A ANXIETY AND DEPRESSION: ICD-10-CM

## 2018-12-04 DIAGNOSIS — M34.9 SYSTEMIC SCLEROSES: Primary | ICD-10-CM

## 2018-12-04 DIAGNOSIS — R11.0 NAUSEA: ICD-10-CM

## 2018-12-04 DIAGNOSIS — K90.49 FOOD INTOLERANCE: ICD-10-CM

## 2018-12-04 DIAGNOSIS — R19.7 DIARRHEA, UNSPECIFIED TYPE: ICD-10-CM

## 2018-12-04 DIAGNOSIS — D50.9 IRON DEFICIENCY ANEMIA, UNSPECIFIED IRON DEFICIENCY ANEMIA TYPE: ICD-10-CM

## 2018-12-04 DIAGNOSIS — R10.84 ABDOMINAL PAIN, GENERALIZED: ICD-10-CM

## 2018-12-04 DIAGNOSIS — R63.4 WEIGHT LOSS: ICD-10-CM

## 2018-12-04 PROBLEM — R79.89 LOW SERUM VITAMIN D: Status: ACTIVE | Noted: 2018-12-04

## 2018-12-04 PROBLEM — I82.629 DEEP VEIN THROMBOSIS (DVT) OF UPPER EXTREMITY: Status: ACTIVE | Noted: 2018-12-04

## 2018-12-04 LAB
ANION GAP SERPL CALC-SCNC: 8 MMOL/L
BUN SERPL-MCNC: 10 MG/DL
CALCIUM SERPL-MCNC: 8.5 MG/DL
CHLORIDE SERPL-SCNC: 110 MMOL/L
CO2 SERPL-SCNC: 24 MMOL/L
CREAT SERPL-MCNC: 0.7 MG/DL
EST. GFR  (AFRICAN AMERICAN): >60 ML/MIN/1.73 M^2
EST. GFR  (NON AFRICAN AMERICAN): >60 ML/MIN/1.73 M^2
GLUCOSE SERPL-MCNC: 112 MG/DL
MAGNESIUM SERPL-MCNC: 2 MG/DL
POTASSIUM SERPL-SCNC: 2.5 MMOL/L
SODIUM SERPL-SCNC: 142 MMOL/L

## 2018-12-04 PROCEDURE — 93005 ELECTROCARDIOGRAM TRACING: CPT

## 2018-12-04 PROCEDURE — 25000003 PHARM REV CODE 250: Performed by: PHYSICIAN ASSISTANT

## 2018-12-04 PROCEDURE — G0378 HOSPITAL OBSERVATION PER HR: HCPCS

## 2018-12-04 PROCEDURE — 99220 PR INITIAL OBSERVATION CARE,LEVL III: CPT | Mod: ,,, | Performed by: PHYSICIAN ASSISTANT

## 2018-12-04 PROCEDURE — 63600175 PHARM REV CODE 636 W HCPCS: Performed by: EMERGENCY MEDICINE

## 2018-12-04 PROCEDURE — 99999 PR PBB SHADOW E&M-EST. PATIENT-LVL V: CPT | Mod: PBBFAC,,, | Performed by: NURSE PRACTITIONER

## 2018-12-04 PROCEDURE — 3008F BODY MASS INDEX DOCD: CPT | Mod: CPTII,S$GLB,, | Performed by: NURSE PRACTITIONER

## 2018-12-04 PROCEDURE — 80048 BASIC METABOLIC PNL TOTAL CA: CPT

## 2018-12-04 PROCEDURE — 63600175 PHARM REV CODE 636 W HCPCS: Performed by: STUDENT IN AN ORGANIZED HEALTH CARE EDUCATION/TRAINING PROGRAM

## 2018-12-04 PROCEDURE — 99285 EMERGENCY DEPT VISIT HI MDM: CPT | Mod: ,,, | Performed by: EMERGENCY MEDICINE

## 2018-12-04 PROCEDURE — 99285 EMERGENCY DEPT VISIT HI MDM: CPT | Mod: 25

## 2018-12-04 PROCEDURE — 25000003 PHARM REV CODE 250: Performed by: STUDENT IN AN ORGANIZED HEALTH CARE EDUCATION/TRAINING PROGRAM

## 2018-12-04 PROCEDURE — 99215 OFFICE O/P EST HI 40 MIN: CPT | Mod: S$GLB,,, | Performed by: NURSE PRACTITIONER

## 2018-12-04 PROCEDURE — 96365 THER/PROPH/DIAG IV INF INIT: CPT

## 2018-12-04 PROCEDURE — 83735 ASSAY OF MAGNESIUM: CPT

## 2018-12-04 PROCEDURE — 96376 TX/PRO/DX INJ SAME DRUG ADON: CPT

## 2018-12-04 PROCEDURE — 93010 ELECTROCARDIOGRAM REPORT: CPT | Mod: ,,, | Performed by: INTERNAL MEDICINE

## 2018-12-04 PROCEDURE — 63600175 PHARM REV CODE 636 W HCPCS: Performed by: PHYSICIAN ASSISTANT

## 2018-12-04 PROCEDURE — 96366 THER/PROPH/DIAG IV INF ADDON: CPT

## 2018-12-04 PROCEDURE — 96375 TX/PRO/DX INJ NEW DRUG ADDON: CPT

## 2018-12-04 RX ORDER — KETOROLAC TROMETHAMINE 30 MG/ML
15 INJECTION, SOLUTION INTRAMUSCULAR; INTRAVENOUS EVERY 6 HOURS
Status: CANCELLED | OUTPATIENT
Start: 2018-12-04 | End: 2018-12-07

## 2018-12-04 RX ORDER — QUETIAPINE FUMARATE 50 MG/1
50 TABLET, EXTENDED RELEASE ORAL NIGHTLY
Status: DISCONTINUED | OUTPATIENT
Start: 2018-12-04 | End: 2018-12-05 | Stop reason: HOSPADM

## 2018-12-04 RX ORDER — DABIGATRAN ETEXILATE 110 MG/1
220 CAPSULE ORAL 2 TIMES DAILY
COMMUNITY
End: 2021-01-01 | Stop reason: CLARIF

## 2018-12-04 RX ORDER — LORAZEPAM 2 MG/ML
2 INJECTION INTRAMUSCULAR ONCE
Status: COMPLETED | OUTPATIENT
Start: 2018-12-04 | End: 2018-12-05

## 2018-12-04 RX ORDER — DIPHENHYDRAMINE HCL 25 MG
CAPSULE ORAL 2 TIMES DAILY
COMMUNITY
End: 2021-01-01 | Stop reason: CLARIF

## 2018-12-04 RX ORDER — ACETAMINOPHEN 325 MG/1
650 TABLET ORAL EVERY 4 HOURS PRN
Status: DISCONTINUED | OUTPATIENT
Start: 2018-12-04 | End: 2018-12-05 | Stop reason: HOSPADM

## 2018-12-04 RX ORDER — ONDANSETRON 8 MG/1
8 TABLET, ORALLY DISINTEGRATING ORAL EVERY 8 HOURS PRN
Status: DISCONTINUED | OUTPATIENT
Start: 2018-12-04 | End: 2018-12-05 | Stop reason: HOSPADM

## 2018-12-04 RX ORDER — ONDANSETRON 4 MG/1
8 TABLET, FILM COATED ORAL
Status: CANCELLED | OUTPATIENT
Start: 2018-12-04

## 2018-12-04 RX ORDER — GLUCAGON 1 MG
1 KIT INJECTION
Status: DISCONTINUED | OUTPATIENT
Start: 2018-12-04 | End: 2018-12-05 | Stop reason: HOSPADM

## 2018-12-04 RX ORDER — KETOROLAC TROMETHAMINE 30 MG/ML
15 INJECTION, SOLUTION INTRAMUSCULAR; INTRAVENOUS
Status: COMPLETED | OUTPATIENT
Start: 2018-12-04 | End: 2018-12-04

## 2018-12-04 RX ORDER — DIPHENHYDRAMINE HCL 50 MG
50 CAPSULE ORAL EVERY 4 HOURS PRN
Status: DISCONTINUED | OUTPATIENT
Start: 2018-12-04 | End: 2018-12-05 | Stop reason: HOSPADM

## 2018-12-04 RX ORDER — CHOLECALCIFEROL (VITAMIN D3) 25 MCG
1000 TABLET ORAL DAILY
Status: DISCONTINUED | OUTPATIENT
Start: 2018-12-05 | End: 2018-12-05 | Stop reason: HOSPADM

## 2018-12-04 RX ORDER — POTASSIUM CHLORIDE 7.45 MG/ML
10 INJECTION INTRAVENOUS
Status: COMPLETED | OUTPATIENT
Start: 2018-12-04 | End: 2018-12-05

## 2018-12-04 RX ORDER — PREDNISONE 10 MG/1
10 TABLET ORAL DAILY
Status: DISCONTINUED | OUTPATIENT
Start: 2018-12-05 | End: 2018-12-05 | Stop reason: HOSPADM

## 2018-12-04 RX ORDER — SILDENAFIL CITRATE 20 MG/1
20 TABLET ORAL NIGHTLY
Status: DISCONTINUED | OUTPATIENT
Start: 2018-12-04 | End: 2018-12-04

## 2018-12-04 RX ORDER — IBUPROFEN 200 MG
16 TABLET ORAL
Status: DISCONTINUED | OUTPATIENT
Start: 2018-12-04 | End: 2018-12-05 | Stop reason: HOSPADM

## 2018-12-04 RX ORDER — PROMETHAZINE HYDROCHLORIDE 12.5 MG/1
SUPPOSITORY RECTAL
COMMUNITY
End: 2021-01-01 | Stop reason: CLARIF

## 2018-12-04 RX ORDER — POLYETHYLENE GLYCOL 3350 17 G/17G
17 POWDER, FOR SOLUTION ORAL DAILY
Status: DISCONTINUED | OUTPATIENT
Start: 2018-12-05 | End: 2018-12-05 | Stop reason: HOSPADM

## 2018-12-04 RX ORDER — POTASSIUM CHLORIDE 20 MEQ/1
20 TABLET, EXTENDED RELEASE ORAL
Status: COMPLETED | OUTPATIENT
Start: 2018-12-04 | End: 2018-12-04

## 2018-12-04 RX ORDER — DIAZEPAM 5 MG/1
5 TABLET ORAL NIGHTLY
Status: CANCELLED | OUTPATIENT
Start: 2018-12-04

## 2018-12-04 RX ORDER — DABIGATRAN ETEXILATE 110 MG/1
220 CAPSULE ORAL 2 TIMES DAILY
Status: DISCONTINUED | OUTPATIENT
Start: 2018-12-04 | End: 2018-12-04

## 2018-12-04 RX ORDER — DIPHENHYDRAMINE HCL 25 MG
25 CAPSULE ORAL EVERY 4 HOURS PRN
Status: DISCONTINUED | OUTPATIENT
Start: 2018-12-04 | End: 2018-12-04

## 2018-12-04 RX ORDER — BISACODYL 10 MG
10 SUPPOSITORY, RECTAL RECTAL DAILY PRN
Status: DISCONTINUED | OUTPATIENT
Start: 2018-12-04 | End: 2018-12-05 | Stop reason: HOSPADM

## 2018-12-04 RX ORDER — ONDANSETRON 2 MG/ML
4 INJECTION INTRAMUSCULAR; INTRAVENOUS
Status: COMPLETED | OUTPATIENT
Start: 2018-12-04 | End: 2018-12-04

## 2018-12-04 RX ORDER — ACETAMINOPHEN 325 MG/1
650 TABLET ORAL EVERY 8 HOURS PRN
Status: DISCONTINUED | OUTPATIENT
Start: 2018-12-04 | End: 2018-12-05 | Stop reason: HOSPADM

## 2018-12-04 RX ORDER — SILDENAFIL CITRATE 20 MG/1
20 TABLET ORAL 3 TIMES DAILY
Status: DISCONTINUED | OUTPATIENT
Start: 2018-12-04 | End: 2018-12-05 | Stop reason: HOSPADM

## 2018-12-04 RX ORDER — IBUPROFEN 200 MG
24 TABLET ORAL
Status: DISCONTINUED | OUTPATIENT
Start: 2018-12-04 | End: 2018-12-05 | Stop reason: HOSPADM

## 2018-12-04 RX ORDER — SODIUM CHLORIDE 0.9 % (FLUSH) 0.9 %
5 SYRINGE (ML) INJECTION
Status: DISCONTINUED | OUTPATIENT
Start: 2018-12-04 | End: 2018-12-05 | Stop reason: HOSPADM

## 2018-12-04 RX ORDER — DICYCLOMINE HYDROCHLORIDE 10 MG/1
10 CAPSULE ORAL
COMMUNITY
End: 2021-01-01 | Stop reason: CLARIF

## 2018-12-04 RX ORDER — DICYCLOMINE HYDROCHLORIDE 10 MG/1
10 CAPSULE ORAL
Status: DISCONTINUED | OUTPATIENT
Start: 2018-12-04 | End: 2018-12-05 | Stop reason: HOSPADM

## 2018-12-04 RX ORDER — IPRATROPIUM BROMIDE AND ALBUTEROL SULFATE 2.5; .5 MG/3ML; MG/3ML
3 SOLUTION RESPIRATORY (INHALATION) EVERY 4 HOURS PRN
Status: DISCONTINUED | OUTPATIENT
Start: 2018-12-04 | End: 2018-12-05 | Stop reason: HOSPADM

## 2018-12-04 RX ORDER — TRAMADOL HYDROCHLORIDE 50 MG/1
50 TABLET ORAL EVERY 6 HOURS PRN
Status: DISCONTINUED | OUTPATIENT
Start: 2018-12-04 | End: 2018-12-05 | Stop reason: HOSPADM

## 2018-12-04 RX ORDER — DIAZEPAM 5 MG/1
5 TABLET ORAL EVERY 12 HOURS PRN
Status: DISCONTINUED | OUTPATIENT
Start: 2018-12-04 | End: 2018-12-05 | Stop reason: HOSPADM

## 2018-12-04 RX ORDER — RAMELTEON 8 MG/1
8 TABLET ORAL NIGHTLY PRN
Status: DISCONTINUED | OUTPATIENT
Start: 2018-12-04 | End: 2018-12-05 | Stop reason: HOSPADM

## 2018-12-04 RX ORDER — SODIUM CHLORIDE AND POTASSIUM CHLORIDE 150; 900 MG/100ML; MG/100ML
1000 INJECTION, SOLUTION INTRAVENOUS CONTINUOUS
Status: DISPENSED | OUTPATIENT
Start: 2018-12-04 | End: 2018-12-04

## 2018-12-04 RX ORDER — QUETIAPINE FUMARATE 50 MG/1
TABLET, EXTENDED RELEASE ORAL
COMMUNITY
Start: 2018-11-01 | End: 2021-01-01 | Stop reason: CLARIF

## 2018-12-04 RX ORDER — DABIGATRAN ETEXILATE 110 MG/1
110 CAPSULE ORAL 2 TIMES DAILY
Status: DISCONTINUED | OUTPATIENT
Start: 2018-12-04 | End: 2018-12-05

## 2018-12-04 RX ORDER — QUETIAPINE FUMARATE 50 MG/1
50 TABLET, EXTENDED RELEASE ORAL DAILY
Status: DISCONTINUED | OUTPATIENT
Start: 2018-12-05 | End: 2018-12-04

## 2018-12-04 RX ADMIN — SILDENAFIL 20 MG: 20 TABLET ORAL at 09:12

## 2018-12-04 RX ADMIN — SODIUM CHLORIDE AND POTASSIUM CHLORIDE 1000 ML: .9; .15 SOLUTION INTRAVENOUS at 03:12

## 2018-12-04 RX ADMIN — QUETIAPINE FUMARATE 50 MG: 50 TABLET, EXTENDED RELEASE ORAL at 09:12

## 2018-12-04 RX ADMIN — RAMELTEON 8 MG: 8 TABLET, FILM COATED ORAL at 08:12

## 2018-12-04 RX ADMIN — POTASSIUM CHLORIDE 10 MEQ: 7.46 INJECTION, SOLUTION INTRAVENOUS at 10:12

## 2018-12-04 RX ADMIN — POTASSIUM CHLORIDE 20 MEQ: 1500 TABLET, EXTENDED RELEASE ORAL at 05:12

## 2018-12-04 RX ADMIN — POTASSIUM CHLORIDE 20 MEQ: 1500 TABLET, EXTENDED RELEASE ORAL at 03:12

## 2018-12-04 RX ADMIN — DICYCLOMINE HYDROCHLORIDE 10 MG: 10 CAPSULE ORAL at 08:12

## 2018-12-04 RX ADMIN — KETOROLAC TROMETHAMINE 15 MG: 30 INJECTION, SOLUTION INTRAMUSCULAR at 04:12

## 2018-12-04 RX ADMIN — DIPHENHYDRAMINE HYDROCHLORIDE 50 MG: 50 CAPSULE ORAL at 08:12

## 2018-12-04 RX ADMIN — KETOROLAC TROMETHAMINE 15 MG: 30 INJECTION, SOLUTION INTRAMUSCULAR at 03:12

## 2018-12-04 RX ADMIN — POTASSIUM CHLORIDE 10 MEQ: 7.46 INJECTION, SOLUTION INTRAVENOUS at 09:12

## 2018-12-04 RX ADMIN — DIAZEPAM 5 MG: 5 TABLET ORAL at 09:12

## 2018-12-04 RX ADMIN — ONDANSETRON 4 MG: 2 INJECTION INTRAMUSCULAR; INTRAVENOUS at 03:12

## 2018-12-04 RX ADMIN — TRAMADOL HYDROCHLORIDE 50 MG: 50 TABLET ORAL at 08:12

## 2018-12-04 RX ADMIN — POTASSIUM BICARBONATE 50 MEQ: 25 TABLET, EFFERVESCENT ORAL at 08:12

## 2018-12-04 NOTE — TELEPHONE ENCOUNTER
Informed pt that  asks that she go to ED due to critical alert of her potassium being at 2.5. Pt verbalized understanding.

## 2018-12-04 NOTE — ED NOTES
Pt given warm blanket, snacks, water. No complaints at this time. No acute distress noted. Family at bs. Will continue to monitor.

## 2018-12-04 NOTE — ED NOTES
Pt given snacks and water. Reports no nausea, 3/10 pain (pt reports this is baseline). No acute distress noted. No complaints at this time. Call bell within reach, attached to cont cardiac monitor. Family at bs. Will continue to monitor.

## 2018-12-04 NOTE — LETTER
December 5, 2018        Chung Jeter MD  [address] [facility] [address]             [facility]  [address]  [facility] [address]  Phone: [phone]  Fax: [phone]   Patient: Jewels Byrnes   MR Number: [id number]   YOB: 1975   Date of Visit: 12/4/2018       Dear Dr. Jeter:    Thank you for referring Jewels Byrnes to me for evaluation. Attached you will find relevant portions of my assessment and plan of care.    If you have questions, please do not hesitate to call me. I look forward to following Jewels Byrnes along with you.    Sincerely,                  CC  No Recipients    Enclosure

## 2018-12-04 NOTE — ED PROVIDER NOTES
Encounter Date: 12/4/2018       History     Chief Complaint   Patient presents with    Abnormal Lab     my potassium in low, i have chronic diarrhea     HPI     Ms. 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 Endorses ongoing watery diarrhea that is maybe slightly worse the past few days, denies bloody diarrhea, endorses palpitation and muscle cramps, dehydration, nausea, and crampy abdominal pain.  Does take oral potassium and has a magnesium patch.  Has had episodes of hypokalemia (lowest 2.2) and hypomagnesemia before that have required IV repletion.      Review of patient's allergies indicates:   Allergen Reactions    Morphine sulfate Anaphylaxis    Sulfa (sulfonamide antibiotics) Anaphylaxis    Gluten protein Other (See Comments)     Celiac and crohn's     Latex, natural rubber Hives    Dairy aid [lactase] Rash     Past Medical History:   Diagnosis Date    Acute pancreatitis     autoimmune pancreatitis    Anemia     Iron infusion- Dr. Vizcaino    C. difficile colitis 2013    x 2     Celiac disease     Cholelithiasis     gallbladder removed 2006    Chronic diarrhea     Colon polyp     E coli infection     Food intolerance     Hemochromatosis     Hepatitis     autoimmune hepatitis     Interstitial lung disease due to connective tissue disease     Irritable bowel syndrome     Pulmonary artery hypertension     Scleroderma      Past Surgical History:   Procedure Laterality Date    APPENDECTOMY  1984    BACK SURGERY  08/2010    CHOLECYSTECTOMY      COLONOSCOPY  2015    COLONOSCOPY N/A 7/13/2016    Procedure: COLONOSCOPY;  Surgeon: Jefry Willett MD;  Location: [address]);  Service: Endoscopy;  Laterality: N/A;     COLONOSCOPY N/A 7/13/2016    [facility] (4TH F[facility])    ESOPHAGOGASTRODUODENOSCOPY (EGD) N/A 7/13/2016    [facility] ([facility]TH FLR)    fecal transplant  2015    gallbladder      HYSTERECTOMY  03/2010    salpingo oophrectomy    [id number]-[id number] Left 6/29/2016    Performed by Pavan Jose [facility] at [facility] OR    LIVER BIOPSY      ad feeding tube      PERIPHERALLY INSERTED CENTRAL CATHETER INSERTION      POLYPECTOMY      right shoulder surgery      UPPER GASTROINTESTINAL ENDOSCOPY  2015     Family History   Problem Relation Age of Onset    No Known Problems Mother     Heart disease Father     Cancer Father         prostate    Heart disease Maternal Grandmother     Heart disease Maternal Uncle     Crohn's disease Cousin     Anesthesia problems Neg Hx     Clotting disorder Neg Hx     Multiple sclerosis Neg Hx     Tuberculosis Neg Hx     Scleroderma Neg Hx     Lymphoma Neg Hx     Lupus Neg Hx     Celiac disease Neg Hx     Cirrhosis Neg Hx     Colon cancer Neg Hx     Colon polyps Neg Hx     Cystic fibrosis Neg Hx     Esophageal cancer Neg Hx     Hemochromatosis Neg Hx     Inflammatory bowel disease Neg Hx     Irritable bowel syndrome Neg Hx     Liver cancer Neg Hx     Liver disease Neg Hx     Rectal cancer Neg Hx     Stomach cancer Neg Hx     Ulcerative colitis Neg Hx     Aram's disease Neg Hx     Rheum arthritis Neg Hx     Melanoma Neg Hx     Psoriasis Neg Hx     Skin cancer Neg Hx      Social History     Tobacco Use    Smoking status: Never Smoker    Smokeless tobacco: Never Used   Substance Use Topics    Alcohol use: No     Frequency: Never     Comment: 1-2 glass of wine every 1-2 weeks    Drug use: No     Review of Systems   Constitutional: Positive for fatigue. Negative for fever.   HENT: Negative for sore throat.    Respiratory: Negative for shortness of breath.    Cardiovascular: Positive for  palpitations. Negative for chest pain.   Gastrointestinal: Positive for abdominal pain, diarrhea and nausea. Negative for blood in stool and vomiting.   Genitourinary: Negative for dysuria.   Musculoskeletal: Positive for myalgias. Negative for back pain.   Skin: Negative for rash and wound.   Neurological: Positive for light-headedness and headaches. Negative for weakness.   Hematological: Does not bruise/bleed easily.       Physical Exam     Initial Vitals [12/04/18 1332]   BP Pulse Resp Temp SpO2   (!) 120/54 83 18 97.5 °F (36.4 °C) 98 %      MAP       --         Physical Exam    Nursing note and vitals reviewed.  Constitutional:   Thin and cachetic, pale appearing female.   HENT:   Head: Normocephalic and atraumatic.   Eyes: EOM are normal. Pupils are equal, round, and reactive to light.   Neck: Normal range of motion.   Cardiovascular: Normal heart sounds and intact distal pulses.   Pulmonary/Chest: Breath sounds normal. No respiratory distress.   Abdominal: Soft. Bowel sounds are normal. She exhibits no distension and no mass. There is tenderness (mild, diffuse tenderness). There is no rebound and no guarding.   Musculoskeletal: Normal range of motion. She exhibits no edema or tenderness.   Neurological: She is alert and oriented to person, place, and time.   Skin: Skin is warm and dry.   Psychiatric: She has a normal mood and affect. Thought content normal.         ED Course   Procedures  Labs Reviewed   BASIC METABOLIC PANEL - Abnormal; Notable for the following components:       Result Value    Potassium 2.5 (*)     Glucose 112 (*)     Calcium 8.5 (*)     All other components within normal limits   MAGNESIUM     EKG Readings: (Independently Interpreted)   Initial Reading: No STEMI. Rhythm: Normal Sinus Rhythm. Ectopy: No Ectopy. Conduction: Normal. ST Segments: Normal ST Segments. Clinical Impression: Normal Sinus Rhythm Other Impression: flattened t waves       Imaging Results    None          Medical  Decision Making:   History:   Old Medical Records: I decided to obtain old medical records.  Old Records Summarized: records from clinic visits, records from previous admission(s) and records from another hospital.       <> Summary of Records: PGY2 MDM:    43F with complicated GI PMH including ecoli and recurrent cdiff s/p fecal transplant, pHTN, scleroderma, autoimmune pancreatitis, sent by GI clinic for hypokalemia of 2.5 seen on CMP drawn this am for routine clinic labs.  Sx include palpitation, generalized abdominal pain, nausea and cramping.  Physical exam notable for VSS, diffuse abdominal pain, soft, non distended, diffuse hypopigmented macules to skin.  Ddx includes hypokalemia, hypomagnesemia, cardiac arrhythmia.  Labs otherwise from this morning normal.  Will repeat BMP, magnesium, give IVF with potassium, oral potassium.  EKG with NSR, no QTc prolongation, but does have diffusely flattened twaves.  Anticipate admission for electrolyte repletion.  Case discussed with Dr. Tellez.    Patricia Colon  Internal Medicine/Emergency Medicine, PGY-2  3:34 PM       APC / Resident Notes:   Update:    Labs notable for potassium 2.5, Mag 2.0.  Lytes otherwise WNL.  Will admit to medicine for obs for IV and oral repletion.  Discussed plan with patient who is agreeable to admission.    Patricia Colon  Internal Medicine/Emergency Medicine, PGY-2  4:46 PM                 Clinical Impression:   The encounter diagnosis was Hypokalemia.                             Patricia Colon MD  Resident  12/04/18 [id number]

## 2018-12-04 NOTE — ED TRIAGE NOTES
Pt reports to ED stating her GI doctor saira labs today reported potassium 2.5. Reports chronic/daily diarrhea 2-3x/day since Dec 2017 after robby ecoli x2.. Denies blood in stool. Reports nausea and cramping in abdomen. Reports dehydration. Denies cp, sob. Reports palpitations increasing over past week.

## 2018-12-04 NOTE — PROGRESS NOTES
[facility] Consultation Note    Reason for Consult:    Chief Complaint   Patient presents with    Nausea    Abdominal Pain    Gas    Bloated    Diarrhea    Weight Loss         PCP:   Chung Jeter   No address on file    Referring MD:  Pulmonology: Dr. Wero Watts  GI:     Previous GI: Dr.S. Willett    HPI:  Jewels Byrnes is a 43 y.o. 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    Frequency:few days weekly  Onset:12/2017 after having E.coli     No Early satiety.    No Vomiting    Improves with:  Some  improvement with zofran 4 mg daily  Some improvement with phenergan 25 mg once weekly (hs)  Has not tried compazine, domperidone, scopolamine patch    Some improvement with reglan over 10 years ago for decreased motility (early satiety). No extrapyramidal s/e. Cannot recall why stopped.    Abdominal pain. Reports abdominal pain  Character:spasms,   Location:generalized, worse across lower abd  Frequency:  Four times weekly  Duration:10-15 minutes  Onset:12/2017 after E. Coli infection  Worse with: food david dairy, beans  Improves with:bentyl 10 mg PRN, ibgard PRN  Associated with Bm: better after diarrhea BM  Nocturnal pain: no  Has not tried  Levsin, Levbid.  Antidepressants:seroquel  Using narcotic pain medication: tramadol  NSAIDs: none    Gas and bloating. Worse across lower abd  Bloating: yes  Excessive gas: yes  Abdominal distension: yes   Symptoms get worse after meals:yes  Symptoms get worse as the day progresses: yes   Consumes lactose:tries to avoid  Consumes artificial sugars:no    Diarrhea.  Reports loose to watery stools.  Worse with dairy or beans  [place]: 7  Frequency:2-3 times daily, better since limiting  lactose, lowering dose of mag and potassium, and since stopping tube feeds, better with antihistamines for allergies/food intolerences  Symptoms started: 12/2017 after E. Coli infection. H/o alternating c/d prior to this.   Nocturnal symptoms: no  Fecal incontinence: no     No improvement with imodium 4-5 tabs at a time  No improvement with lomotil 4-5 tabs at a time  No improvement with cholestyramine TID  Has not tried viberzi  Has not tried fiber supplement  Unable to tolerate pancreatic enzymes d/t contains sulphites  No improvement with recent course of Entocort.    Weight loss. From 95 lbs to 79 lbs since 1/2018. Drinking protein shakes. Was on various peg tube feedings from 2/2018-8/2018. [facility]. Felt worse on all formulas: diarrhea, stomach discomfort, fatigue.    Anxiety. Depression. On seroquel 50 mg HS. On valium 5 mg PRN. Per pcp.  Has seen therapist in the past. Has not seen psychiatrist.    H/o SBO in 5/2018. After starting elavil and remeron. Treated with NG tube.     H/o diverticulitis in 2012. Treated with flagyl and doxy    Collagenous colitis (2006). Lymphocystic colitis (2012). Treated with entcort 9 mg daily intermittently since 2012 with improvement.     H/o SIBO. 6/2014, 10/2015, 1/2018  treated with xifaxan 550 mg TID with improvement each time.    H/o E. Coli infection. 12/2017. Admitted. Treated with IV flagyl. Had picc line. Had TPN (from 1/2018-10/2018)  and PEG tube feeding. Unable to tolerate d/t suspected allergic reactions to sulphites, coconut in formulas.    H/o C. Diff 5/2013 treated with flagyl and vanco, 4/2015 treated with fecal transplant 6/2015.    Autoimmune hepatitis and autoimmune pancreatits in 2006. W/ liver failure. S/p IV cytoxin. Previously treated with prednisone and 6 MP. Currently on prednisone 10 mg daily.  Followed by hepatology Dr. Monteiro @ [facility]. Pt reports recent fibroscan good w/ PRN f/u.     H/o blood clot in right arm in 10/2018 2/2  picc line. On pradaxa 110 mg BID. Per cardiology. Intends to be on therapy for 6 months.     Systemic sclerosis. gen body pain. Taking tramadol 50 mg BID per pcp. Taking prednisone 10 mg once daily per pcp. Previously followed by rheumatology in [facility].     Pulmonary HTN. ILD. 2/2 systemic sclerosis. revatio 20 mg 5-6 times daily. Followed by Dr. Vicente Watts.    Low mag and K+. On mag patch 500 mg and K+ 800-100 mg BID-TID. Per pcp    Food sensitivity. Potatoes. Dairy. Gluten. Night shade vegetables. Increased heart rate. Diarrhea. Takes atenolol 25 mg prn per pcp. Takes benadryl 25 mg HS.     POTS Autonomic ROS:  Syncope: no  Lightheadedness: yes  Vertigo: no  Unsteadiness: no  Falls:no  Cognitive impairment (brain fog, concentration difficulties): yes  Vision problems:no  Light sensitivity:yes  Pain, discoloration or numbness in feet:no   Urinary problems: no  Erectile dysfunction:n/a  Heat intolerance:yes  Excessive sweating:no  Loss of sweating:yes  Exercise intolerance:yes  Palpitations or tachycardia: yes  Dry eyes:yes  Dry mouth:yes  Headaches:yes  Tremor:no  Sleep disturbance: no    Mast Cell Activation Syndrome:   +Flushing, +pruritus, no dermographism, no urticaria, +upper and lower respiratory symptoms, no conjunctival injection and ocular itching, +bronconstriction, +wheezing, +hypotension, +lightheadedness , no syncope,+ tachycardia, +headache, +fatigue, +lack of concentration, and mild cognitive problems, +psych problems,  Early phase unexplained anaphylaxis- takes antihistamines to prevent.    Denies dysphagia, GERD,  vomiting, early satiety, constipation, BRBPR, melena,  insomnia.       Total visit time was 90 minutes, more than 50% of which was spent in face-to-face counseling with patient regarding symptoms, diagnostic results, prognosis, risks and benefits of treatment options, instructions for management, importance of compliance with chosen treatment options, risk factor reduction, stress  reduction, coping strategies.      Previous Studies:   EGD 7/13/16: nl esophagus. Nl stomach (mild acute and chr gastritis.no hpylori.). Nl duodenum (-).   Colon 7/13/16: GPTTI. Nl colon (lymphocytic colitis). Nl TI (mild chronic inflamm and villous flattening).   MRI a/p 7/1/16: mild mural thickening of the cecum and proximal ascending colon may be phasic or may represent sequela of colitis.     Labs:  11/7/18: CMP cl high 112, ca low 8.3, T protein low 6.0, cbc unremarkable  7/2016 stool o/p negative, g/c -  6/23/16: rbc low 3.84, cmp sodium low 135, calcium low 8.6, albumin low 3.4  TTG/Iga -  TSH nl    Relevant surgeries:  PEG changed (5/2018)  PEG initiated (2/2018)  Cholecystectomy (2006)    ROS:  ROS   Constitutional: No fevers, no chills, no night sweats, + weight loss  ENT: + congestion, no rhinorrhea, + chronic sinus problems  CV: No chest pain, no palpitations  Pulm: no cough, + shortness of breath  Ophtho: No blurry vision, no eye redness  GI: see HPI  Derm: + rash  Heme: No lymphadenopathy, no bruising  MSK: No joint pain, no joint swelling, + Raynauds  : No dysuria, no frequent urination, no blood in urine  Endo: + hot or cold intolerance  Neuro: No dizziness, no syncope, no seizure  Psych: + anxiety, + depression      Medical History:   Past Medical History:   Diagnosis Date    Acute pancreatitis     autoimmune pancreatitis    Anemia     Iron infusion- Dr. Vizcaino    C. difficile colitis 2013    x 2     Celiac disease     Cholelithiasis     gallbladder removed 2006    Chronic diarrhea     Colon polyp     E coli infection     Food intolerance     Hemochromatosis     Hepatitis     autoimmune hepatitis     Interstitial lung disease due to connective tissue disease     Irritable bowel syndrome     Pulmonary artery hypertension     Scleroderma         Surgical History:   Past Surgical History:   Procedure Laterality Date    APPENDECTOMY  1984    BACK SURGERY  08/2010    CHOLECYSTECTOMY       COLONOSCOPY  2015    COLONOSCOPY N/A 7/13/2016    Procedure: COLONOSCOPY;  Surgeon: Jefry Willett MD;  Location: [facility] (4TH FLR);  Service: Endoscopy;  Laterality: N/A;    COLONOSCOPY N/A 7/13/2016    Performed by Jefry Willett MD at [facility] (4TH FLR)    ESOPHAGOGASTRODUODENOSCOPY (EGD) N/A 7/13/2016    Performed by Jefry Willett MD at [facility] (4TH FLR)    fecal transplant  2015    gallbladder      HYSTERECTOMY  03/2010    salpingo oophrectomy    AACXNIXUT-WTCB-W-CATH Left 6/29/2016    [facility]    LIVER BIOPSY      ad feeding tube      PERIPHERALLY INSERTED CENTRAL CATHETER INSERTION      POLYPECTOMY      right shoulder surgery      UPPER GASTROINTESTINAL ENDOSCOPY  2015        Family History:   Family History   Problem Relation Age of Onset    No Known Problems Mother     Heart disease Father     Cancer Father         prostate    Heart disease Maternal Grandmother     Heart disease Maternal Uncle     Crohn's disease Cousin     Anesthesia problems Neg Hx     Clotting disorder Neg Hx     Multiple sclerosis Neg Hx     Tuberculosis Neg Hx     Scleroderma Neg Hx     Lymphoma Neg Hx     Lupus Neg Hx     Celiac disease Neg Hx     Cirrhosis Neg Hx     Colon cancer Neg Hx     Colon polyps Neg Hx     Cystic fibrosis Neg Hx     Esophageal cancer Neg Hx     Hemochromatosis Neg Hx     Inflammatory bowel disease Neg Hx     Irritable bowel syndrome Neg Hx     Liver cancer Neg Hx     Liver disease Neg Hx     Rectal cancer Neg Hx     Stomach cancer Neg Hx     Ulcerative colitis Neg Hx     Aram's disease Neg Hx     Rheum arthritis Neg Hx     Melanoma Neg Hx     Psoriasis Neg Hx     Skin cancer Neg Hx         Social History:   Social History     Socioeconomic History    Marital status: Single     Spouse name: None    Number of children: None    Years of education: None    Highest education level: None   Social Needs     Financial resource strain: None    Food insecurity - worry: None    Food insecurity - inability: None    Transportation needs - medical: None    Transportation needs - non-medical: None   Occupational History    Occupation: physical therapist   Tobacco Use    Smoking status: Never Smoker    Smokeless tobacco: Never Used   Substance and Sexual Activity    Alcohol use: No     Frequency: Never     Comment: 1-2 glass of wine every 1-2 weeks    Drug use: No    Sexual activity: None   Other Topics Concern    None   Social History Narrative    Works as a pt        Review of patient's allergies indicates:   Allergen Reactions    Morphine sulfate Anaphylaxis    Sulfa (sulfonamide antibiotics) Anaphylaxis    Gluten protein Other (See Comments)     Celiac and crohn's     Latex, natural rubber Hives    Dairy aid [lactase] Rash       Current Outpatient Medications   Medication Sig Dispense Refill    atenolol (TENORMIN) 25 MG tablet Take 25 mg by mouth daily as needed. Only if needed for tachycardia      dabigatran etexilate (PRADAXA) 110 mg Cap Take 220 mg by mouth 2 (two) times daily.      diazePAM (VALIUM) 5 MG tablet       dicyclomine (BENTYL) 10 MG capsule Take 10 mg by mouth 4 (four) times daily before meals and nightly.      diphenhydrAMINE (BENADRYL) 25 mg capsule 2 (two) times daily.      ondansetron (ZOFRAN) 8 MG tablet Take 8 mg by mouth every 8 (eight) hours.      ondansetron (ZOFRAN-ODT) 4 MG TbDL   0    predniSONE (DELTASONE) 10 MG tablet Take 10 mg by mouth once daily. Use dose in AM prior to surgery      promethazine (PHENERGAN) 12.5 MG Supp Phenergan   25 mg prn      quetiapine 50 mg oral tb24 (SEROQUEL XR) 50 mg Tb24 Seroquel XR 50 mg tablet,extended release   Take 1 tablet every day by oral route.      sildenafil (REVATIO) 20 mg tablet Take 20 mg by mouth every evening.       tramadol (ULTRAM) 50 mg tablet Take 50 mg by mouth every 6 (six) hours as needed for Pain.      UNABLE TO  "FIND medication name: oxygen       No current facility-administered medications for this visit.         Objective Findings:  Vital Signs:  BP 94/67   Pulse 80   Ht 5' 2" (1.575 m)   Wt 35.9 kg (79 lb 2.3 oz)   BMI 14.48 kg/m²   Body mass index is 14.48 kg/m².    Physical Exam:  General appearance: alert, cooperative, no distress, evidence of systemic sclerosis w  narrow oral aperture.   HENT: Normocephalic, atraumatic, neck symmetrical, no nasal discharge  Eyes: conjunctivae/corneas clear, PERRL, EOM's intact  Lungs: clear to auscultation bilaterally, no dullness to percussion bilaterally  Heart: regular rate and rhythm without rub; no displacement of the PMI  Abdomen: soft, non-tender; bowel sounds normoactive; no organomegaly. PEG (yamile key) in place.  Extremities: extremities symmetric; no clubbing, cyanosis, or edema. +  raynauds,   No sclerodactyly, osteolysis of digits, or digital ulcers.  Integument: Skin color, texture, turgor normal; no rashes; hair distrubution normal,  No telangectasia, + tightness of skin.   Neurologic: Alert and oriented X 3, normal strength, normal coordination and gait  Psychiatric: no pressured speech; normal affect; no evidence of impaired cognition    Labs:  Lab Results   Component Value Date    WBC 9.46 12/04/2018    HGB 14.4 12/04/2018    HCT 42.4 12/04/2018    MCV 92 12/04/2018     12/04/2018     Lab Results   Component Value Date    FERRITIN 148 12/04/2018     Lab Results   Component Value Date     12/04/2018    K 2.5 (LL) 12/04/2018     12/04/2018    CO2 25 12/04/2018    GLU 92 12/04/2018    BUN 9 12/04/2018    CREATININE 0.6 12/04/2018    CALCIUM 8.4 (L) 12/04/2018    PROT 6.5 12/04/2018    ALBUMIN 3.7 12/04/2018    BILITOT 0.6 12/04/2018    ALKPHOS 88 12/04/2018    AST 26 12/04/2018    ALT 33 12/04/2018     Lab Results   Component Value Date    TSH 0.859 12/04/2018     Lab Results   Component Value Date    SEDRATE 4 06/16/2016     Lab Results "   Component Value Date    CRP 0.2 06/16/2016     No results found for: LABA1C, HGBA1C    Assessment and Plan:    Jewels Byrnes is a 43 y.o. 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  Since 12/2017 after infection with E.coli. No early satiety. No vomiting.  Some  improvement with zofran 4 mg daily  Some improvement with phenergan 25 mg once weekly (HS)  Some improvement with reglan over 10 years ago for decreased motility (early satiety). No extrapyramidal side effects. Cannot recall why stopped.  -EGD w g/d bx  -Check labs  -Will consider GES    Abdominal pain. Associated with bowel movements.  On seroquel, tramadol  Improves with:bentyl 10 mg PRN, Ibgard PRN  -EGD  -Check labs    Gas and bloating  Avoids artificial sugars.  -Eliminate lactose    Diarrhea.    Not a candidate for viberzie due to ho cholecystectomy  Unable to tolerate pancreatic enzymes d/t contains sulphites  No improvement with imodium   No improvement with lomotil  No improvement with cholestyramine TID  No improvement with recent course of Entocort.  Better since limiting lactose, lowering dose of mag and potassium, and since stopping tube feeds.  Better with antihistamines for allergies/food intolerences  -Colonoscopy w/ r/l bx, EGD w dudenal bx  -Check labs  -Check stool studies    Collagenous colitis (2006). Lymphocystic colitis (2012). Treated with entcort 9 mg daily intermittently since 2012 with improvement.  Recently diarrhea did not respond to entecort    Weight loss. From 95 lbs to 79 lbs since 1/2018. Drinking protein shakes. Was on various peg tube feedings from 2/2018-8/2018. [facility]. Felt worse on all formulas: diarrhea, stomach discomfort, fatigue.     Anxiety.  Depression. On seroquel 50 mg HS. On valium 5 mg PRN. Per pcp.  Has seen therapist in the past. Has not seen psychiatrist.  -Discussed the role of therapy in management of functional GI disorders.  Patient agrees to consider counseling.   -Referral to psychology    Food sensitivity. Potatoes. Dairy. Gluten. Night shade vegetables. Feels unwell, has increased heart rate, diarrhea.   Takes atenolol 25 mg prn per pcp. Takes benadryl and other antihistamines w improvement  -Referral to allergy/imunology Dr. Kaley Patel at [facility].    H/o SBO in 5/2018. After starting elavil and remeron. Treated with NG tube.     H/o diverticulitis in 2012. Treated with flagyl and doxy    H/o SIBO. 6/2014, 10/2015, 1/2018  treated with xifaxan 550 mg TID with improvement each time.    H/o E. Coli infection. 12/2017. Admitted. Treated with IV flagyl. Had picc line. Had TPN (from 1/2018-10/2018)  and PEG tube feeding. Unable to tolerate tube feedings d/t suspected allergic reactions to sulphites and coconut in formulas.    H/o C. Diff 5/2013 treated with flagyl and vanco, 4/2015 treated with fecal transplant 6/2015.    Autoimmune hepatitis and autoimmune pancreatits in 2006. w/ liver failure.   S/p IV cytoxin.   Previously treated with prednisone and 6 MP.   Currently on prednisone 10 mg daily.    Followed by hepatology Dr. Monteiro @ [facility]. Pt reports recent fibroscan good w/ PRN f/u.     H/o blood clot in right arm in 10/2018 2/2 picc line. On pradaxa 110 mg BID. Per cardiology. Intends to be on therapy for 6 months.     Systemic sclerosis.   Taking prednisone 10 mg once daily per pcp  Previously followed by rheumatology in [facility].   Does not want to see Dr. Tovar anymore    Gen body pain.   Taking tramadol 50 mg BID per pcp.  -Discussed the role of narcotic pain medication in motility and functional gastrointestinal disorders.  Will attempt to limit narcotic use.     Pulmonary HTN. ILD. 2/2 systemic sclerosis. revatio 20  mg 5-6 times daily. Followed by Dr. Vicente Watts.    Low mag and K+. On mag patch 500 mg and K+ 800-100 mg BID-TID. Per pcp    Mast Cell Activation Syndrome ROS positive.   -ref to allergy and immnunology  -check tryptase    POTS Autonomic ROS positive.    Follow-up in about 3 months (around 3/4/2019) for Motility w/ Dr. Watts.    1. Systemic scleroses    2. Nausea    3. Abdominal pain, generalized    4. Weight loss    5. Anxiety and depression    6. Diarrhea, unspecified type    7. Food intolerance    8. Iron deficiency anemia, unspecified iron deficiency anemia type          Order summary:  Orders Placed This Encounter    Stool culture    Clostridium difficile EIA    CBC auto differential    Comprehensive metabolic panel    TSH    Iron and TIBC    Ferritin    Carotene, serum    Celiac Disease Panel    Celiac Disease HLA Genotyping    Copper, serum    Folate    Vitamin A    Vitamin B12    Vitamin D    Vitamin E    Zinc    Tryptase    Prealbumin    Giardia / Cryptosporidum, EIA    Stool Exam-Ova,Cysts,Parasites    Ambulatory consult to Psychology    Ambulatory referral/consult to Allergy    Case request GI: EGD (ESOPHAGOGASTRODUODENOSCOPY), COLONOSCOPY         Thank you so much for allowing me to participate in the care of Jewels Byrnes          DARLYN Vail, FNP-C    I have personally reviewed history, performed physical exam, and educated the patient.  I have reviewed and agree with today's findings and the care plan outlined by Tara Westbrook NP.       Marissa Watts MD

## 2018-12-04 NOTE — TELEPHONE ENCOUNTER
Patient has an order for an EGD and colonoscopy .  She is currently being tested for C-diff.  Instructed patient that per Endoscopy protocol-we need to have the results of her lab work prior to scheduling procedure.  Informed her that after receiving the results, she can contact the Endoscopy Scheduling department to proceed with scheduling. Main line phone number given to call back.  Stated understanding.

## 2018-12-05 VITALS
RESPIRATION RATE: 19 BRPM | WEIGHT: 75 LBS | SYSTOLIC BLOOD PRESSURE: 97 MMHG | OXYGEN SATURATION: 96 % | HEIGHT: 62 IN | DIASTOLIC BLOOD PRESSURE: 63 MMHG | TEMPERATURE: 99 F | HEART RATE: 81 BPM | BODY MASS INDEX: 13.8 KG/M2

## 2018-12-05 LAB
ANION GAP SERPL CALC-SCNC: 5 MMOL/L
BASOPHILS # BLD AUTO: 0.06 K/UL
BASOPHILS NFR BLD: 0.7 %
BUN SERPL-MCNC: 7 MG/DL
CALCIUM SERPL-MCNC: 7.9 MG/DL
CHLORIDE SERPL-SCNC: 120 MMOL/L
CO2 SERPL-SCNC: 16 MMOL/L
CREAT SERPL-MCNC: 0.6 MG/DL
DIFFERENTIAL METHOD: ABNORMAL
EOSINOPHIL # BLD AUTO: 0 K/UL
EOSINOPHIL NFR BLD: 0 %
ERYTHROCYTE [DISTWIDTH] IN BLOOD BY AUTOMATED COUNT: 16 %
EST. GFR  (AFRICAN AMERICAN): >60 ML/MIN/1.73 M^2
EST. GFR  (NON AFRICAN AMERICAN): >60 ML/MIN/1.73 M^2
ESTIMATED AVG GLUCOSE: ABNORMAL MG/DL
GLUCOSE SERPL-MCNC: 74 MG/DL
HBA1C MFR BLD HPLC: <4 %
HCT VFR BLD AUTO: 36.4 %
HGB BLD-MCNC: 12.1 G/DL
IMM GRANULOCYTES # BLD AUTO: 0.02 K/UL
IMM GRANULOCYTES NFR BLD AUTO: 0.2 %
LYMPHOCYTES # BLD AUTO: 3.2 K/UL
LYMPHOCYTES NFR BLD: 34.9 %
MAGNESIUM SERPL-MCNC: 1.8 MG/DL
MCH RBC QN AUTO: 31.3 PG
MCHC RBC AUTO-ENTMCNC: 33.2 G/DL
MCV RBC AUTO: 94 FL
MONOCYTES # BLD AUTO: 0.8 K/UL
MONOCYTES NFR BLD: 9 %
NEUTROPHILS # BLD AUTO: 5 K/UL
NEUTROPHILS NFR BLD: 55.2 %
NRBC BLD-RTO: 0 /100 WBC
PHOSPHATE SERPL-MCNC: 2.4 MG/DL
PLATELET # BLD AUTO: 206 K/UL
PMV BLD AUTO: 12.5 FL
POCT GLUCOSE: 135 MG/DL (ref 70–110)
POCT GLUCOSE: 67 MG/DL (ref 70–110)
POCT GLUCOSE: 89 MG/DL (ref 70–110)
POTASSIUM SERPL-SCNC: 3.6 MMOL/L
RBC # BLD AUTO: 3.86 M/UL
SODIUM SERPL-SCNC: 141 MMOL/L
WBC # BLD AUTO: 9.09 K/UL

## 2018-12-05 PROCEDURE — 80048 BASIC METABOLIC PNL TOTAL CA: CPT

## 2018-12-05 PROCEDURE — 36415 COLL VENOUS BLD VENIPUNCTURE: CPT

## 2018-12-05 PROCEDURE — 85025 COMPLETE CBC W/AUTO DIFF WBC: CPT

## 2018-12-05 PROCEDURE — 99217 PR OBSERVATION CARE DISCHARGE: CPT | Mod: ,,, | Performed by: NURSE PRACTITIONER

## 2018-12-05 PROCEDURE — 84100 ASSAY OF PHOSPHORUS: CPT

## 2018-12-05 PROCEDURE — 25000003 PHARM REV CODE 250: Performed by: PHYSICIAN ASSISTANT

## 2018-12-05 PROCEDURE — 63600175 PHARM REV CODE 636 W HCPCS: Performed by: PHYSICIAN ASSISTANT

## 2018-12-05 PROCEDURE — 63600175 PHARM REV CODE 636 W HCPCS: Performed by: NURSE PRACTITIONER

## 2018-12-05 PROCEDURE — 83036 HEMOGLOBIN GLYCOSYLATED A1C: CPT

## 2018-12-05 PROCEDURE — G0378 HOSPITAL OBSERVATION PER HR: HCPCS

## 2018-12-05 PROCEDURE — 83735 ASSAY OF MAGNESIUM: CPT

## 2018-12-05 PROCEDURE — 25000003 PHARM REV CODE 250: Performed by: HOSPITALIST

## 2018-12-05 PROCEDURE — 25000003 PHARM REV CODE 250: Performed by: NURSE PRACTITIONER

## 2018-12-05 RX ORDER — DABIGATRAN ETEXILATE 75 MG/1
75 CAPSULE ORAL 2 TIMES DAILY
Status: DISCONTINUED | OUTPATIENT
Start: 2018-12-05 | End: 2018-12-05

## 2018-12-05 RX ORDER — SODIUM,POTASSIUM PHOSPHATES 280-250MG
1 POWDER IN PACKET (EA) ORAL ONCE
Status: COMPLETED | OUTPATIENT
Start: 2018-12-05 | End: 2018-12-05

## 2018-12-05 RX ORDER — POTASSIUM CHLORIDE 750 MG/1
20 CAPSULE, EXTENDED RELEASE ORAL 2 TIMES DAILY
Qty: 120 CAPSULE | Refills: 0 | Status: SHIPPED | OUTPATIENT
Start: 2018-12-05 | End: 2019-01-04

## 2018-12-05 RX ORDER — SODIUM CHLORIDE AND POTASSIUM CHLORIDE 150; 900 MG/100ML; MG/100ML
INJECTION, SOLUTION INTRAVENOUS CONTINUOUS
Status: DISPENSED | OUTPATIENT
Start: 2018-12-05 | End: 2018-12-05

## 2018-12-05 RX ORDER — DABIGATRAN ETEXILATE 75 MG/1
75 CAPSULE ORAL 2 TIMES DAILY
Status: DISCONTINUED | OUTPATIENT
Start: 2018-12-05 | End: 2018-12-05 | Stop reason: HOSPADM

## 2018-12-05 RX ORDER — CHOLECALCIFEROL (VITAMIN D3) 25 MCG
1000 TABLET ORAL DAILY
COMMUNITY
Start: 2018-12-06 | End: 2021-01-01 | Stop reason: CLARIF

## 2018-12-05 RX ORDER — KETOROLAC TROMETHAMINE 30 MG/ML
15 INJECTION, SOLUTION INTRAMUSCULAR; INTRAVENOUS ONCE
Status: COMPLETED | OUTPATIENT
Start: 2018-12-05 | End: 2018-12-05

## 2018-12-05 RX ADMIN — DICYCLOMINE HYDROCHLORIDE 10 MG: 10 CAPSULE ORAL at 06:12

## 2018-12-05 RX ADMIN — DABIGATRAN ETEXILATE MESYLATE 75 MG: 75 CAPSULE ORAL at 01:12

## 2018-12-05 RX ADMIN — DICYCLOMINE HYDROCHLORIDE 10 MG: 10 CAPSULE ORAL at 05:12

## 2018-12-05 RX ADMIN — DIAZEPAM 5 MG: 5 TABLET ORAL at 11:12

## 2018-12-05 RX ADMIN — POTASSIUM & SODIUM PHOSPHATES POWDER PACK 280-160-250 MG 1 PACKET: 280-160-250 PACK at 09:12

## 2018-12-05 RX ADMIN — TRAMADOL HYDROCHLORIDE 50 MG: 50 TABLET ORAL at 04:12

## 2018-12-05 RX ADMIN — PREDNISONE 10 MG: 10 TABLET ORAL at 09:12

## 2018-12-05 RX ADMIN — SILDENAFIL 20 MG: 20 TABLET ORAL at 12:12

## 2018-12-05 RX ADMIN — LORAZEPAM 2 MG: 2 INJECTION INTRAMUSCULAR; INTRAVENOUS at 12:12

## 2018-12-05 RX ADMIN — VITAMIN D, TAB 1000IU (100/BT) 1000 UNITS: 25 TAB at 09:12

## 2018-12-05 RX ADMIN — SILDENAFIL 20 MG: 20 TABLET ORAL at 03:12

## 2018-12-05 RX ADMIN — KETOROLAC TROMETHAMINE 15 MG: 30 INJECTION, SOLUTION INTRAMUSCULAR at 10:12

## 2018-12-05 RX ADMIN — SODIUM CHLORIDE AND POTASSIUM CHLORIDE: .9; .15 SOLUTION INTRAVENOUS at 10:12

## 2018-12-05 RX ADMIN — ONDANSETRON 8 MG: 8 TABLET, ORALLY DISINTEGRATING ORAL at 05:12

## 2018-12-05 RX ADMIN — SILDENAFIL 20 MG: 20 TABLET ORAL at 09:12

## 2018-12-05 RX ADMIN — DICYCLOMINE HYDROCHLORIDE 10 MG: 10 CAPSULE ORAL at 11:12

## 2018-12-05 NOTE — ASSESSMENT & PLAN NOTE
- continue home valium and seroquel  - patient continually asking for IV ativan; patient is in no acute distress; not pscyhotic; has no seizure like activity. Patient told that there is no indication for IV benzodiazepines at this time; patient provided with home medications

## 2018-12-05 NOTE — H&P
[facility] Hospital Medicine  History & Physical    Patient Name: Jewels Byrnes  MRN: [id number]  Admission Date: 12/4/2018  Attending Physician: Ute Edwards MD   Primary Care Provider: Chung Jeter MD    [facility] Medicine Team: [facility] [place] Gerardo Abrams PA-C     Patient information was obtained from patient, parent, past medical records and ER records.     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 She was seen in GI clinic today for 2nd opinion and was found to have hypoK+ of 2.5, admission was recommended for replacement. She had been taking 20 mEq of potassium supplementation daily, but it was affecting her stomach and she stopped. She had been taking OTC potassium since then. She reports more fatigue, muscle cramps, and occasional tachycardia which she controled with PRN atenolol. She has been hospitalized many times, but this is her second admission for hypoK. She otherwise is in good spirits and agrees with continued outpatient follow up of her chronic abdominal issues. She denies any acute complaints on admission. She works as a PT at [employer].     Past Medical History:   Diagnosis Date    Acute pancreatitis     autoimmune pancreatitis    Anemia     Iron infusion- Dr. Vizcaino    C. difficile colitis 2013    x 2     Celiac disease     Cholelithiasis     gallbladder removed 2006    Chronic diarrhea     Colon polyp     E coli infection     Food intolerance     Hemochromatosis     Hepatitis      autoimmune hepatitis     Interstitial lung disease due to connective tissue disease     Irritable bowel syndrome     Pulmonary artery hypertension     Scleroderma        Past Surgical History:   Procedure Laterality Date    APPENDECTOMY  1984    BACK SURGERY  08/2010    CHOLECYSTECTOMY      COLONOSCOPY  2015    COLONOSCOPY N/A 7/13/2016    Procedure: COLONOSCOPY;  Surgeon: Jefry Willett MD;  Location: [facility] (4TH FLR);  Service: Endoscopy;  Laterality: N/A;    COLONOSCOPY N/A 7/13/2016    Performed by J[facility]ry Willett MD at [facility] (4TH FLR)    ESOPHAGOGASTRODUODENOSCOPY (EGD) N/A 7/13/2016    Performed by Jefry Willett MD at [facility] (4TH FLR)    fecal transplant  2015    gallbladder      HYSTERECTOMY  03/2010    salpingo oophrectomy    [id number]-CATH Left 6/29/2016    [facility]    LIVER BIOPSY      ad feeding tube      PERIPHERALLY INSERTED CENTRAL CATHETER INSERTION      POLYPECTOMY      right shoulder surgery      UPPER GASTROINTESTINAL ENDOSCOPY  2015       Review of patient's allergies indicates:   Allergen Reactions    Morphine sulfate Anaphylaxis    Sulfa (sulfonamide antibiotics) Anaphylaxis    Gluten protein Other (See Comments)     Celiac and crohn's     Latex, natural rubber Hives    Dairy aid [lactase] Rash       No current facility-administered medications on file prior to encounter.      Current Outpatient Medications on File Prior to Encounter   Medication Sig    atenolol (TENORMIN) 25 MG tablet Take 25 mg by mouth daily as needed. Only if needed for tachycardia    dabigatran etexilate (PRADAXA) 110 mg Cap Take 220 mg by mouth 2 (two) times daily.    diazePAM (VALIUM) 5 MG tablet     dicyclomine (BENTYL) 10 MG capsule Take 10 mg by mouth 4 (four) times daily before meals and nightly.    ondansetron (ZOFRAN-ODT) 4 MG TbDL     predniSONE (DELTASONE) 10 MG tablet Take 10 mg by mouth once daily. Use dose in AM prior to  surgery    sildenafil (REVATIO) 20 mg tablet Take 20 mg by mouth every evening.     tramadol (ULTRAM) 50 mg tablet Take 50 mg by mouth every 6 (six) hours as needed for Pain.    diphenhydrAMINE (BENADRYL) 25 mg capsule 2 (two) times daily.    ondansetron (ZOFRAN) 8 MG tablet Take 8 mg by mouth every 8 (eight) hours.    promethazine (PHENERGAN) 12.5 MG Supp Phenergan   25 mg prn    quetiapine 50 mg oral tb24 (SEROQUEL XR) 50 mg Tb24 Seroquel XR 50 mg tablet,extended release   Take 1 tablet every day by oral route.    UNABLE TO FIND medication name: oxygen    [DISCONTINUED] (Magic mouthwash) 1:1:1 Benadryl 12.5mg/5ml liq, aluminum & magnesium hydroxide-simehticone (Maalox), lidocaine viscous 2% Swish and spit 5 mLs every 4 (four) hours as needed.    [DISCONTINUED] budesonide (ENTOCORT EC) 3 mg capsule Take 9 mg by mouth once daily. Takes 1 - 2 caps a day    [DISCONTINUED] budesonide (ENTOCORT EC) 3 mg capsule TAKE 2 CAPSULES BY MOUTH ONCE EVERY DAY    [DISCONTINUED] HYDROcodone-acetaminophen (NORCO) 5-325 mg per tablet     [DISCONTINUED] KLOR-CON M20 20 mEq tablet     [DISCONTINUED] senna-docusate 8.6-50 mg (PERICOLACE) 8.6-50 mg per tablet Take 2 tablets by mouth daily as needed.     [DISCONTINUED] tiZANidine (ZANAFLEX) 4 MG tablet      Family History     Problem Relation (Age of Onset)    Cancer Father    Crohn's disease Cousin    Heart disease Father, Maternal Grandmother, Maternal Uncle    No Known Problems Mother        Tobacco Use    Smoking status: Never Smoker    Smokeless tobacco: Never Used   Substance and Sexual Activity    Alcohol use: No     Frequency: Never     Comment: 1-2 glass of wine every 1-2 weeks    Drug use: No    Sexual activity: Not on file     Review of Systems   Constitutional: Positive for activity change, appetite change and fatigue. Negative for fever.   Respiratory: Negative for cough and shortness of breath.    Gastrointestinal: Positive for abdominal pain and  diarrhea. Negative for abdominal distention, nausea and vomiting.   Genitourinary: Negative for difficulty urinating and dysuria.   Musculoskeletal: Positive for myalgias. Negative for gait problem.   Skin: Negative for rash and wound.   Neurological: Positive for weakness. Negative for dizziness.   Psychiatric/Behavioral: Negative for agitation and confusion.     Objective:     Vital Signs (Most Recent):  Temp: 98 °F (36.7 °C) (12/04/18 2003)  Pulse: 71 (12/04/18 2009)  Resp: 18 (12/04/18 2003)  BP: (!) 91/58 (12/04/18 2003)  SpO2: 97 % (12/04/18 2003) Vital Signs (24h Range):  Temp:  [97.5 °F (36.4 °C)-98 °F (36.7 °C)] 98 °F (36.7 °C)  Pulse:  [66-89] 71  Resp:  [12-22] 18  SpO2:  [97 %-100 %] 97 %  BP: ()/(54-76) 91/58     Weight: 75 kg (165 lb 5.5 oz)  Body mass index is 30.24 kg/m².    Physical Exam   Constitutional: She is oriented to person, place, and time. She appears cachectic. No distress.   HENT:   Head: Normocephalic and atraumatic.   Eyes: EOM are normal.   Neck: Neck supple.   Cardiovascular: Normal rate and regular rhythm.   No murmur heard.  Pulmonary/Chest: Effort normal and breath sounds normal. No respiratory distress.   Abdominal: Soft. Bowel sounds are normal. She exhibits no distension. There is no tenderness.   Musculoskeletal: Normal range of motion. She exhibits no edema.   Neurological: She is alert and oriented to person, place, and time.   Skin: Skin is warm and dry. She is not diaphoretic. There is pallor.   Psychiatric: She has a normal mood and affect. Her behavior is normal. Judgment and thought content normal.   Nursing note and vitals reviewed.        CRANIAL NERVES     CN III, IV, VI   Extraocular motions are normal.        Significant Labs:   CBC:   Recent Labs   Lab 12/04/18  1051   WBC 9.46   HGB 14.4   HCT 42.4        CMP:   Recent Labs   Lab 12/04/18  1051 12/04/18  1537    142   K 2.5* 2.5*    110   CO2 25 24   GLU 92 112*   BUN 9 10   CREATININE 0.6  0.7   CALCIUM 8.4* 8.5*   PROT 6.5  --    ALBUMIN 3.7  --    BILITOT 0.6  --    ALKPHOS 88  --    AST 26  --    ALT 33  --    ANIONGAP 7* 8   EGFRNONAA >60.0 >60.0       Significant Imaging: EKG: I have reviewed all pertinent results/findings within the past 24 hours and my personal findings are: no acute changes  I have reviewed all pertinent imaging results/findings within the past 24 hours.    Assessment/Plan:     * Hypokalemia    - 2/2 GI losses  - replace PO and IV  - trend in AM  - EKG with flattened Twaves, maintain on tele  - d/c with potassium 20 mEq CAPSULES that can be opened and sprinkled into her smoothies     Deep vein thrombosis (DVT) of upper extremity    - stable, on pradaxa     Low serum vitamin D    - start vitamin d daily     Anxiety    - continue home valium and seroquel     Pulmonary hypertensive venous disease    - continue home sildenafil     Microscopic colitis    - complex case, follows with GI  - patient states her GI complaints are stable and not requesting GI consult while inpatient     Scleroderma    - continue prednisone 10 mg       VTE Risk Mitigation (From admission, onward)        Ordered     Place GARCIA hose  Until discontinued      12/04/18 1958     Place sequential compression device  Until discontinued      12/04/18 1958     IP VTE HIGH RISK PATIENT  Once      12/04/18 1958             Gerardo Abrams PA-C  Department of Hospital Medicine   [facility]

## 2018-12-05 NOTE — PLAN OF CARE
Problem: Patient Care Overview  Goal: Plan of Care Review  Outcome: Ongoing (interventions implemented as appropriate)  Discharge summary reviewed with patient. Verbalized understanding. PIV removed. Telemonitor removed. Patient kcl 20 meEq IVPB d/c after 5 hours. Patient received information on potassium and contraindications. Patient will leave unit once complete with dinner. NAD noted. Will continue to monitor.

## 2018-12-05 NOTE — ED NOTES
Pt taken to obs 3084a on stretcher via transport on tele. No complaints at this time. No acute distress noted.

## 2018-12-05 NOTE — DISCHARGE SUMMARY
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 She was seen in GI clinic today for 2nd opinion and was found to have hypoK+ of 2.5, admission was recommended for replacement. She had been taking 20 mEq of potassium supplementation daily, but it was affecting her stomach and she stopped. She had been taking OTC potassium since then. She reports more fatigue, muscle cramps, and occasional tachycardia which she controled with PRN atenolol. She has been hospitalized many times, but this is her second admission for hypoK. She otherwise is in good spirits and agrees with continued outpatient follow up of her chronic abdominal issues. She denies any acute complaints on admission. She works as a PT at [employer].     * No surgery found *      Hospital Course:   Patient admitted to hospital for hypokalemia. Patient supplemented and K+ corrected to 3.6. Patient was to be taking potassium supplementation PO but stated it upset her stomach. Capsules provided on discharge to sprinkle in her smoothie as supplementation. She is closely followed by GI.      Consults:     * Hypokalemia    - 2/2 GI losses  - replace PO and IV  - resolved to 3.6  - EKG with flattened Twaves, maintain on tele  - d/c with potassium 20 mEq CAPSULES that  can be opened and sprinkled into her smoothies     Deep vein thrombosis (DVT) of upper extremity    - stable, on pradaxa     Low serum vitamin D    - start vitamin d daily     Anxiety    - continue home valium and seroquel  - patient continually asking for IV ativan; patient is in no acute distress; not pscyhotic; has no seizure like activity. Patient told that there is no indication for IV benzodiazepines at this time; patient provided with home medications     Pulmonary hypertensive venous disease    - continue home sildenafil     Microscopic colitis    - complex case, follows with GI  - patient states her GI complaints are stable and not requesting GI consult while inpatient     Scleroderma    - continue prednisone 10 mg  - toradol 15mg IVP once       Final Active Diagnoses:    Diagnosis Date Noted POA    PRINCIPAL PROBLEM:  Hypokalemia [E87.6] 12/04/2018 Yes    Anxiety [F41.9] 12/04/2018 Yes    Low serum vitamin D [R79.89] 12/04/2018 Yes    Deep vein thrombosis (DVT) of upper extremity [I82.629] 12/04/2018 Yes    Pulmonary hypertensive venous disease [I27.29]  Yes    Microscopic colitis [K52.839] 06/17/2016 Yes    Scleroderma [M34.9] 10/15/2013 Yes      Problems Resolved During this Admission:       Discharged Condition: good    Disposition: Home or Self Care    Follow Up:  Follow-up Information     Tara Westbrook NP.    Specialty:  Gastroenterology  Why:  As needed, If symptoms worsen  Contact information:  [address] [facility] [address]  [phone]             Marissa Watts MD On 3/7/2019.    Specialty:  Gastroenterology  Why:  at 8:00 AM  Contact information:  [address] [facility] [address]  [phone]                 Patient Instructions:   No discharge procedures on file.    Significant Diagnostic Studies: Labs:   CMP   Recent Labs   Lab 12/04/18  1051 12/04/18  1537 12/05/18  0358    142 141   K 2.5* 2.5* 3.6    110 120*   CO2 25 24 16*   GLU 92 112* 74    BUN 9 10 7   CREATININE 0.6 0.7 0.6   CALCIUM 8.4* 8.5* 7.9*   PROT 6.5  --   --    ALBUMIN 3.7  --   --    BILITOT 0.6  --   --    ALKPHOS 88  --   --    AST 26  --   --    ALT 33  --   --    ANIONGAP 7* 8 5*   ESTGFRAFRICA >60.0 >60.0 >60.0   EGFRNONAA >60.0 >60.0 >60.0    and CBC   Recent Labs   Lab 12/04/18  1051 12/05/18  0358   WBC 9.46 9.09   HGB 14.4 12.1   HCT 42.4 36.4*    206       Pending Diagnostic Studies:     None         Medications:  Reconciled Home Medications:      Medication List      START taking these medications    potassium chloride 10 MEQ Cpsr  Commonly known as:  MICRO-K  Take 2 capsules (20 mEq total) by mouth 2 (two) times daily.     vitamin D 1000 units Tab  Commonly known as:  VITAMIN D3  Take 1 tablet (1,000 Units total) by mouth once daily.  Start taking on:  12/6/2018        CONTINUE taking these medications    atenolol 25 MG tablet  Commonly known as:  TENORMIN  Take 25 mg by mouth daily as needed. Only if needed for tachycardia     BENADRYL 25 mg capsule  Generic drug:  diphenhydrAMINE  2 (two) times daily.     diazePAM 5 MG tablet  Commonly known as:  VALIUM     dicyclomine 10 MG capsule  Commonly known as:  BENTYL  Take 10 mg by mouth 4 (four) times daily before meals and nightly.     ondansetron 4 MG Tbdl  Commonly known as:  ZOFRAN-ODT     ondansetron 8 MG tablet  Commonly known as:  ZOFRAN  Take 8 mg by mouth every 8 (eight) hours.     PHENERGAN 12.5 MG Supp  Generic drug:  promethazine  Phenergan   25 mg prn     PRADAXA 110 mg Cap  Generic drug:  dabigatran etexilate  Take 220 mg by mouth 2 (two) times daily.     predniSONE 10 MG tablet  Commonly known as:  DELTASONE  Take 10 mg by mouth once daily. Use dose in AM prior to surgery     SEROquel XR 50 mg Tb24  Generic drug:  quetiapine 50 mg oral tb24  Seroquel XR 50 mg tablet,extended release   Take 1 tablet every day by oral route.     sildenafil 20 mg Tab  Commonly known as:  REVATIO  Take 20 mg by mouth every  evening.     traMADol 50 mg tablet  Commonly known as:  ULTRAM  Take 50 mg by mouth every 6 (six) hours as needed for Pain.     UNABLE TO FIND  medication name: oxygen            Indwelling Lines/Drains at time of discharge:   Lines/Drains/Airways          None          Time spent on the discharge of patient: >35 minutes  Patient was seen and examined on the date of discharge and determined to be suitable for discharge.         Hali Cota NP  Department of Hospital Medicine  [facility]

## 2018-12-05 NOTE — ASSESSMENT & PLAN NOTE
- 2/2 GI losses  - replace PO and IV  - trend in AM  - EKG with flattened Twaves, maintain on tele  - d/c with potassium 20 mEq CAPSULES that can be opened and sprinkled into her smoothies

## 2018-12-05 NOTE — SUBJECTIVE & OBJECTIVE
Past Medical History:   Diagnosis Date    Acute pancreatitis     autoimmune pancreatitis    Anemia     Iron infusion- Dr. Vizcaino    C. difficile colitis 2013    x 2     Celiac disease     Cholelithiasis     gallbladder removed 2006    Chronic diarrhea     Colon polyp     E coli infection     Food intolerance     Hemochromatosis     Hepatitis     autoimmune hepatitis     Interstitial lung disease due to connective tissue disease     Irritable bowel syndrome     Pulmonary artery hypertension     Scleroderma        Past Surgical History:   Procedure Laterality Date    APPENDECTOMY  1984    BACK SURGERY  08/2010    CHOLECYSTECTOMY      COLONOSCOPY  2015    COLONOSCOPY N/A 7/13/2016    Procedure: COLONOSCOPY;  Surgeon: Jefry Willett MD;  [facility]  Service: Endoscopy;  Laterality: N/A;    COLONOSCOPY N/A 7/13/2016    Performed by Jefry [facility]lett MD at [facility] (4TH FLR)    ESOPHAGOGASTRODUODENOSCOPY (EGD) N/A 7/13/2016    Performed by Jefry [facility]lett MD at [facility] (4TH FLR)    fecal transplant  2015    gallbladder      HYSTERECTOMY  03/2010    salpingo oophrectomy    [id number]-CATH Left 6/29/2016    [facility]    LIVER BIOPSY      ad feeding tube      PERIPHERALLY INSERTED CENTRAL CATHETER INSERTION      POLYPECTOMY      right shoulder surgery      UPPER GASTROINTESTINAL ENDOSCOPY  2015       Review of patient's allergies indicates:   Allergen Reactions    Morphine sulfate Anaphylaxis    Sulfa (sulfonamide antibiotics) Anaphylaxis    Gluten protein Other (See Comments)     Celiac and crohn's     Latex, natural rubber Hives    Dairy aid [lactase] Rash       No current facility-administered medications on file prior to encounter.      Current Outpatient Medications on File Prior to Encounter   Medication Sig    atenolol (TENORMIN) 25 MG tablet Take 25 mg by mouth daily as needed. Only if needed for tachycardia     dabigatran etexilate (PRADAXA) 110 mg Cap Take 220 mg by mouth 2 (two) times daily.    diazePAM (VALIUM) 5 MG tablet     dicyclomine (BENTYL) 10 MG capsule Take 10 mg by mouth 4 (four) times daily before meals and nightly.    ondansetron (ZOFRAN-ODT) 4 MG TbDL     predniSONE (DELTASONE) 10 MG tablet Take 10 mg by mouth once daily. Use dose in AM prior to surgery    sildenafil (REVATIO) 20 mg tablet Take 20 mg by mouth every evening.     tramadol (ULTRAM) 50 mg tablet Take 50 mg by mouth every 6 (six) hours as needed for Pain.    diphenhydrAMINE (BENADRYL) 25 mg capsule 2 (two) times daily.    ondansetron (ZOFRAN) 8 MG tablet Take 8 mg by mouth every 8 (eight) hours.    promethazine (PHENERGAN) 12.5 MG Supp Phenergan   25 mg prn    quetiapine 50 mg oral tb24 (SEROQUEL XR) 50 mg Tb24 Seroquel XR 50 mg tablet,extended release   Take 1 tablet every day by oral route.    UNABLE TO FIND medication name: oxygen    [DISCONTINUED] (Magic mouthwash) 1:1:1 Benadryl 12.5mg/5ml liq, aluminum & magnesium hydroxide-simehticone (Maalox), lidocaine viscous 2% Swish and spit 5 mLs every 4 (four) hours as needed.    [DISCONTINUED] budesonide (ENTOCORT EC) 3 mg capsule Take 9 mg by mouth once daily. Takes 1 - 2 caps a day    [DISCONTINUED] budesonide (ENTOCORT EC) 3 mg capsule TAKE 2 CAPSULES BY MOUTH ONCE EVERY DAY    [DISCONTINUED] HYDROcodone-acetaminophen (NORCO) 5-325 mg per tablet     [DISCONTINUED] KLOR-CON M20 20 mEq tablet     [DISCONTINUED] senna-docusate 8.6-50 mg (PERICOLACE) 8.6-50 mg per tablet Take 2 tablets by mouth daily as needed.     [DISCONTINUED] tiZANidine (ZANAFLEX) 4 MG tablet      Family History     Problem Relation (Age of Onset)    Cancer Father    Crohn's disease Cousin    Heart disease Father, Maternal Grandmother, Maternal Uncle    No Known Problems Mother        Tobacco Use    Smoking status: Never Smoker    Smokeless tobacco: Never Used   Substance and Sexual Activity    Alcohol use:  No     Frequency: Never     Comment: 1-2 glass of wine every 1-2 weeks    Drug use: No    Sexual activity: Not on file     Review of Systems   Constitutional: Positive for activity change, appetite change and fatigue. Negative for fever.   Respiratory: Negative for cough and shortness of breath.    Gastrointestinal: Positive for abdominal pain and diarrhea. Negative for abdominal distention, nausea and vomiting.   Genitourinary: Negative for difficulty urinating and dysuria.   Musculoskeletal: Positive for myalgias. Negative for gait problem.   Skin: Negative for rash and wound.   Neurological: Positive for weakness. Negative for dizziness.   Psychiatric/Behavioral: Negative for agitation and confusion.     Objective:     Vital Signs (Most Recent):  Temp: 98 °F (36.7 °C) (12/04/18 2003)  Pulse: 71 (12/04/18 2009)  Resp: 18 (12/04/18 2003)  BP: (!) 91/58 (12/04/18 2003)  SpO2: 97 % (12/04/18 2003) Vital Signs (24h Range):  Temp:  [97.5 °F (36.4 °C)-98 °F (36.7 °C)] 98 °F (36.7 °C)  Pulse:  [66-89] 71  Resp:  [12-22] 18  SpO2:  [97 %-100 %] 97 %  BP: ()/(54-76) 91/58     Weight: 75 kg (165 lb 5.5 oz)  Body mass index is 30.24 kg/m².    Physical Exam   Constitutional: She is oriented to person, place, and time. She appears cachectic. No distress.   HENT:   Head: Normocephalic and atraumatic.   Eyes: EOM are normal.   Neck: Neck supple.   Cardiovascular: Normal rate and regular rhythm.   No murmur heard.  Pulmonary/Chest: Effort normal and breath sounds normal. No respiratory distress.   Abdominal: Soft. Bowel sounds are normal. She exhibits no distension. There is no tenderness.   Musculoskeletal: Normal range of motion. She exhibits no edema.   Neurological: She is alert and oriented to person, place, and time.   Skin: Skin is warm and dry. She is not diaphoretic. There is pallor.   Psychiatric: She has a normal mood and affect. Her behavior is normal. Judgment and thought content normal.   Nursing note and  vitals reviewed.        CRANIAL NERVES     CN III, IV, VI   Extraocular motions are normal.        Significant Labs:   CBC:   Recent Labs   Lab 12/04/18  1051   WBC 9.46   HGB 14.4   HCT 42.4        CMP:   Recent Labs   Lab 12/04/18  1051 12/04/18  1537    142   K 2.5* 2.5*    110   CO2 25 24   GLU 92 112*   BUN 9 10   CREATININE 0.6 0.7   CALCIUM 8.4* 8.5*   PROT 6.5  --    ALBUMIN 3.7  --    BILITOT 0.6  --    ALKPHOS 88  --    AST 26  --    ALT 33  --    ANIONGAP 7* 8   EGFRNONAA >60.0 >60.0       Significant Imaging: EKG: I have reviewed all pertinent results/findings within the past 24 hours and my personal findings are: no acute changes  I have reviewed all pertinent imaging results/findings within the past 24 hours.

## 2018-12-05 NOTE — ASSESSMENT & PLAN NOTE
- complex case, follows with GI  - patient states her GI complaints are stable and not requesting GI consult while inpatient

## 2018-12-05 NOTE — ASSESSMENT & PLAN NOTE
- 2/2 GI losses  - replace PO and IV  - resolved to 3.6  - EKG with flattened Twaves, maintain on tele  - d/c with potassium 20 mEq CAPSULES that can be opened and sprinkled into her smoothies

## 2018-12-05 NOTE — PLAN OF CARE
12/05/18 1055   Discharge Assessment   Assessment Type Discharge Planning Assessment   Confirmed/corrected address and phone number on facesheet? Yes   Assessment information obtained from? Patient   Expected Length of Stay (days) 1   Communicated expected length of stay with patient/caregiver yes   Prior to hospitilization cognitive status: Alert/Oriented   Prior to hospitalization functional status: Independent   Current cognitive status: Alert/Oriented   Current Functional Status: Independent   Lives With alone   Able to Return to Prior Arrangements yes   Is patient able to care for self after discharge? Yes   Patient's perception of discharge disposition home or selfcare   Readmission Within The Last 30 Days no previous admission in last 30 days   Patient currently being followed by outpatient case management? No   Patient currently receives any other outside agency services? No   Equipment Currently Used at Home other (see comments)  (BP machine)   Do you have any problems affording any of your prescribed medications? No   Is the patient taking medications as prescribed? yes   Does the patient have transportation home? Yes   Transportation Available family or friend will provide  (mother, Sarah Byrnes ([phone]))   Does the patient receive services at [facility]? No   Discharge Plan A Home   Discharge Plan B Home Health   Patient/Family In Agreement With Plan yes     Dx: hypokalemia  [facility]: [facility] f/u appt: Dr. Marissa Watts (GI) on 3/7/19 at 0800

## 2018-12-05 NOTE — HOSPITAL COURSE
Patient admitted to hospital for hypokalemia. Patient supplemented and K+ corrected to 3.6. Patient was to be taking potassium supplementation PO but stated it upset her stomach. Capsules provided on discharge to sprinkle in her smoothie as supplementation. She is closely followed by GI.

## 2018-12-05 NOTE — HPI
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 She was seen in GI clinic today for 2nd opinion and was found to have hypoK+ of 2.5, admission was recommended for replacement. She had been taking 20 mEq of potassium supplementation daily, but it was affecting her stomach and she stopped. She had been taking OTC potassium since then. She reports more fatigue, muscle cramps, and occasional tachycardia which she controled with PRN atenolol. She has been hospitalized many times, but this is her second admission for hypoK. She otherwise is in good spirits and agrees with continued outpatient follow up of her chronic abdominal issues. She denies any acute complaints on admission. She works as a PT at [employer].

## 2018-12-05 NOTE — PLAN OF CARE
Problem: VTE, DVT and PE (Adult)  Goal: Signs and Symptoms of Listed Potential Problems Will be Absent, Minimized or Managed (VTE, DVT and PE)  Signs and symptoms of listed potential problems will be absent, minimized or managed by discharge/transition of care (reference VTE, DVT and PE (Adult) CPG).  Patient has DVT in upper Right arm.  She is here for treatment of that only.  Will continue to monitor.

## 2018-12-05 NOTE — NURSING
Pt arrived to OBS unit via stretcher from ER. Pt accompanied by transport associate and mother. Pt aaox4. Pt reports 3-4/10 pain abd, body and neck. Pt denies nausea. Pt oriented to room. Bed at lowest level wheels locked. Call light within pt reach.

## 2018-12-06 NOTE — PLAN OF CARE
12/06/18 0821   Final Note   Assessment Type Final Discharge Note     Patient discharged home with no needs on 12/5/18.

## 2018-12-07 ENCOUNTER — PATIENT MESSAGE (OUTPATIENT)
Dept: GASTROENTEROLOGY | Facility: CLINIC | Age: 43
End: 2018-12-07

## 2018-12-13 ENCOUNTER — TELEPHONE (OUTPATIENT)
Dept: GASTROENTEROLOGY | Facility: CLINIC | Age: 43
End: 2018-12-13

## 2018-12-13 ENCOUNTER — PATIENT MESSAGE (OUTPATIENT)
Dept: GASTROENTEROLOGY | Facility: CLINIC | Age: 43
End: 2018-12-13

## 2018-12-14 ENCOUNTER — PATIENT MESSAGE (OUTPATIENT)
Dept: GASTROENTEROLOGY | Facility: CLINIC | Age: 43
End: 2018-12-14

## 2018-12-17 ENCOUNTER — DOCUMENTATION ONLY (OUTPATIENT)
Dept: GASTROENTEROLOGY | Facility: CLINIC | Age: 43
End: 2018-12-17

## 2018-12-18 ENCOUNTER — TELEPHONE (OUTPATIENT)
Dept: GASTROENTEROLOGY | Facility: CLINIC | Age: 43
End: 2018-12-18

## 2018-12-18 ENCOUNTER — NUTRITION (OUTPATIENT)
Dept: GASTROENTEROLOGY | Facility: CLINIC | Age: 43
End: 2018-12-18

## 2018-12-18 ENCOUNTER — PATIENT MESSAGE (OUTPATIENT)
Dept: GASTROENTEROLOGY | Facility: CLINIC | Age: 43
End: 2018-12-18

## 2018-12-18 VITALS — WEIGHT: 78.5 LBS | BODY MASS INDEX: 14.35 KG/M2

## 2018-12-18 DIAGNOSIS — M34.9 SCLERODERMA: Primary | ICD-10-CM

## 2018-12-18 DIAGNOSIS — F41.9 ANXIETY: ICD-10-CM

## 2018-12-18 DIAGNOSIS — R63.4 WEIGHT LOSS: ICD-10-CM

## 2018-12-18 DIAGNOSIS — R79.89 LOW SERUM VITAMIN D: ICD-10-CM

## 2018-12-18 DIAGNOSIS — I27.29 PULMONARY HYPERTENSIVE VENOUS DISEASE: ICD-10-CM

## 2018-12-18 DIAGNOSIS — D50.9 IRON DEFICIENCY ANEMIA, UNSPECIFIED IRON DEFICIENCY ANEMIA TYPE: ICD-10-CM

## 2018-12-18 DIAGNOSIS — Z79.52 ON PREDNISONE THERAPY: ICD-10-CM

## 2018-12-18 DIAGNOSIS — K90.49 FOOD INTOLERANCE: ICD-10-CM

## 2018-12-18 DIAGNOSIS — R19.7 DIARRHEA, UNSPECIFIED TYPE: ICD-10-CM

## 2018-12-18 PROCEDURE — 99999 PR PBB SHADOW E&M-EST. PATIENT-LVL II: CPT | Mod: PBBFAC,,,

## 2018-12-18 NOTE — PROGRESS NOTES
Referring Physician:  Marissa Watts   Patient attends session with mother today   Social: Physical Therapist at [facility] / has taken fmla to address medical issues   Type of Visit : an initial evaluation  Reason for Visit:    Scleroderma long term steroid use      Pulmonary Hypertension recent evidence of possible clots - on blood thinners     Tachycardia effect of consuming foods with sulfites or histamine     Multiple food intolerances Nightshade veg; coconut oil , sulfur containing foods , histamine containing foods; gluten , dairy , fodmaps     PTSD sees therapist regularly ; hospital admissions trigger for PTSD     Hx enteral feeds, TPN poor tolerance of feeds due to multiple food intolerances     Corticosteroid dependence interferes with accurate testing  corticosteroid dependence interferes with accurate testing     Dumping syndrome sugars , fats trigger     Hx Pancreatic insufficiency cannot tolerate creon ; new product tolerated( Enzymedica digest gold) but pt unsure of dosing        Clinical Signs and Symptoms:   Food intolerance :Tachycardia with ingestion of  Nightshade vegetables( eggplant, potatoes, tomatoes , okra) ; foods containing sulfites ( often found in coating of pills and capsules as well as in foodstuffs ); coconut oil , sulfur containing foods ( eggs) ; histamine- containing foods ( cured meats, fermented foods , aged cheeses ) ; gluten , dairy ; seeds and nuts     Had found improvement of symptoms with elimination of fodmaps as well . These are not triggers for tachycardia     Fat and sugar triggers dumping syndrome     Has used Creon in the past but could not tolerate the product due to sulfites contained in capsule coating .  Has found product she can tolerate but is unsure of dosing Enzymedica Digest Gold     Low levels of the following vits and minerals : Zinc - 56, Carotene < 10 , Vit e 897, Vit D Magneium 1.8; 14, Vit A retinol 27 ( low) ; Tryptase WNL    Vitamin/Supplements/Herbs: Just started within the past week : Pure Encapsulation formula or [employer]  Formula - Sulfite free  Zinc 30 mg  Vit A 10,000 iu  Vit D3 20,000 iu  Beta Carotene 2500 iu         Bowel :   Loose stools 1-5 times per day bloating, gas, diarrhea       Previous Medical History:  Past Medical History:   Diagnosis Date    Acute pancreatitis     autoimmune pancreatitis    Anemia     Iron infusion- Dr. Vizcaino    C. difficile colitis 2013    x 2     Celiac disease     Cholelithiasis     gallbladder removed 2006    Chronic diarrhea     Colon polyp     E coli infection     Food intolerance     Hemochromatosis     Hepatitis     autoimmune hepatitis     Interstitial lung disease due to connective tissue disease     Irritable bowel syndrome     Pulmonary artery hypertension     Scleroderma        Previous Surgical History:  Past Surgical History:   Procedure Laterality Date    APPENDECTOMY  1984    BACK SURGERY  08/2010    CHOLECYSTECTOMY      COLONOSCOPY  2015    COLONOSCOPY N/A 7/13/2016    Procedure: COLONOSCOPY;  Surgeon: Jefry Willett MD;  [facility]  Service: Endoscopy;  Laterality: N/A;    COLONOSCOPY N/A 7/13/2016    Performed by Jefry Willett MD at [facility] (4TH FLR)    ESOPHAGOGASTRODUODENOSCOPY (EGD) N/A 7/13/2016    Performed by Jefry Willett MD at [facility] (4TH FLR)    fecal transplant  2015    gallbladder      HYSTERECTOMY  03/2010    salpingo oophrectomy    [id number] Left 6/29/2016    [facility]    LIVER BIOPSY      ad feeding tube      PERIPHERALLY INSERTED CENTRAL CATHETER INSERTION      POLYPECTOMY      right shoulder surgery      UPPER GASTROINTESTINAL ENDOSCOPY  2015       Medication:    Current Outpatient Medications:     atenolol (TENORMIN) 25 MG tablet, Take 25 mg by mouth daily as needed. Only if needed for tachycardia, Disp: , Rfl:     dabigatran etexilate (PRADAXA)  110 mg Cap, Take 220 mg by mouth 2 (two) times daily., Disp: , Rfl:     diazePAM (VALIUM) 5 MG tablet, , Disp: , Rfl:     dicyclomine (BENTYL) 10 MG capsule, Take 10 mg by mouth 4 (four) times daily before meals and nightly., Disp: , Rfl:     diphenhydrAMINE (BENADRYL) 25 mg capsule, 2 (two) times daily., Disp: , Rfl:     ondansetron (ZOFRAN) 8 MG tablet, Take 8 mg by mouth every 8 (eight) hours., Disp: , Rfl:     ondansetron (ZOFRAN-ODT) 4 MG TbDL, , Disp: , Rfl: 0    potassium chloride (MICRO-K) 10 MEQ CpSR, Take 2 capsules (20 mEq total) by mouth 2 (two) times daily., Disp: 120 capsule, Rfl: 0    predniSONE (DELTASONE) 10 MG tablet, Take 10 mg by mouth once daily. Use dose in AM prior to surgery, Disp: , Rfl:     promethazine (PHENERGAN) 12.5 MG Supp, Phenergan  25 mg prn, Disp: , Rfl:     quetiapine 50 mg oral tb24 (SEROQUEL XR) 50 mg Tb24, Seroquel XR 50 mg tablet,extended release  Take 1 tablet every day by oral route., Disp: , Rfl:     sildenafil (REVATIO) 20 mg tablet, Take 20 mg by mouth every evening. , Disp: , Rfl:     tramadol (ULTRAM) 50 mg tablet, Take 50 mg by mouth every 6 (six) hours as needed for Pain., Disp: , Rfl:     UNABLE TO FIND, medication name: oxygen, Disp: , Rfl:     vitamin D (VITAMIN D3) 1000 units Tab, Take 1 tablet (1,000 Units total) by mouth once daily., Disp: , Rfl:       Vitamin/Supplements/Herbs: Just started within the past week : Pure Encapsulation formula or [employer]  Formula - Sulfite free  Zinc 30 mg  Vit A 10,000 iu  Vit D3 20,000 iu  Beta Carotene 2500 iu    Potential interactions with food   Reports having alcat test done for food sensitivity years ago but does not recall the outcome   Has delayed allergy testing due to chronic dependence on steroids which she is concerned would interfere with accuracy of test   Food intolerance :Tachycardia with ingestion of  Nightshade vegetables( eggplant, potatoes, tomatoes , okra) ; foods containing sulfites ( often  found in coating of pills and capsules as well as in foodstuffs ); coconut oil , sulfur containing foods ( eggs) ; histamine- containing foods ( cured meats, fermented foods , aged cheeses ) ; gluten , dairy ; seeds and nuts        Allergies:  Review of patient's allergies indicates:   Allergen Reactions    Morphine sulfate Anaphylaxis    Sulfa (sulfonamide antibiotics) Anaphylaxis    Gluten protein Other (See Comments)     Celiac and crohn's     Latex, natural rubber Hives    Dairy aid [lactase] Rash       Labs:Last Labs:  Last Labs:  Glucose   Date Value Ref Range Status   12/05/2018 74 70 - 110 mg/dL Final   12/04/2018 112 (H) 70 - 110 mg/dL Final     BUN, Bld   Date Value Ref Range Status   12/05/2018 7 6 - 20 mg/dL Final   12/04/2018 10 6 - 20 mg/dL Final     Creatinine   Date Value Ref Range Status   12/05/2018 0.6 0.5 - 1.4 mg/dL Final   12/04/2018 0.7 0.5 - 1.4 mg/dL Final     Sodium   Date Value Ref Range Status   12/05/2018 141 136 - 145 mmol/L Final   12/04/2018 142 136 - 145 mmol/L Final     Potassium   Date Value Ref Range Status   12/05/2018 3.6 3.5 - 5.1 mmol/L Final   12/04/2018 2.5 (LL) 3.5 - 5.1 mmol/L Final     Comment:     *Critical value -  Results called to and read back by:Diana Casali,RN     Phosphorus   Date Value Ref Range Status   12/05/2018 2.4 (L) 2.7 - 4.5 mg/dL Final     Calcium   Date Value Ref Range Status   12/05/2018 7.9 (L) 8.7 - 10.5 mg/dL Final   12/04/2018 8.5 (L) 8.7 - 10.5 mg/dL Final     Prealbumin   Date Value Ref Range Status   12/04/2018 22 20 - 43 mg/dL Final     Total Protein   Date Value Ref Range Status   12/04/2018 6.5 6.0 - 8.4 g/dL Final   06/23/2016 6.1 6.0 - 8.4 g/dL Final     No results found for: CHOL  Hemoglobin A1C   Date Value Ref Range Status   12/05/2018 <4.0 (A) 4.0 - 5.6 % Final     Comment:     ADA Screening Guidelines:  5.7-6.4%  Consistent with prediabetes  >or=6.5%  Consistent with diabetes  High levels of fetal hemoglobin interfere with the  "HbA1C  assay. Heterozygous hemoglobin variants (HbS, HgC, etc)do  not significantly interfere with this assay.   However, presence of multiple variants may affect accuracy.       Hemoglobin   Date Value Ref Range Status   2018 12.1 12.0 - 16.0 g/dL Final   2018 14.4 12.0 - 16.0 g/dL Final     Hematocrit   Date Value Ref Range Status   2018 36.4 (L) 37.0 - 48.5 % Final   2018 42.4 37.0 - 48.5 % Final     Iron   Date Value Ref Range Status   2018 68 30 - 160 ug/dL Final     No components found for: FROLATE  Vit D, 25-Hydroxy   Date Value Ref Range Status   2018 14 (L) 30 - 96 ng/mL Final     Comment:     Vitamin D deficiency.........<10 ng/mL                              Vitamin D insufficiency......10-29 ng/mL       Vitamin D sufficiency........> or equal to 30 ng/mL  Vitamin D toxicity............>100 ng/mL       WBC   Date Value Ref Range Status   2018 9.09 3.90 - 12.70 K/uL Final   2018 9.46 3.90 - 12.70 K/uL Final       : 1975  Age: 43 y.o.    Anthropometrics    Estimated body mass index is 14.35 kg/m² as calculated from the following:    Height as of 18: 5' 2" (1.575 m).    Weight as of this encounter: 35.6 kg (78 lb 7.7 oz).    For Adults 20 Years and Older:    BMI Weight Status   Below 18.5 Underweight   18.6-24.9 Normal/Healthy   25.0-29.9 Overweight   30.0 & Above Obese     Ideal Weight Range for Your Height: 5'2" = 104 - 135 lbs    Weight History:  Wt Readings from Last 12 Encounters:   18 35.6 kg (78 lb 7.7 oz)   18 34 kg (75 lb)   18 35.9 kg (79 lb 2.3 oz)   18 37.6 kg (83 lb)   04/10/17 46.3 kg (102 lb 1.2 oz)   16 48 kg (105 lb 13.1 oz)   16 44.5 kg (98 lb)   16 45.4 kg (100 lb)   16 44 kg (97 lb 0 oz)   16 46.3 kg (102 lb 1.2 oz)   16 44.2 kg (97 lb 7.1 oz)   16 45.9 kg (101 lb 3.2 oz)   ]      Weight Changes/Trend: Weight history 115# " comfortable/ideal weight  "  95# usual " body weight in recent years; teens: 140#   23% decrease in weight in 18 months       Estimated Nutrition Needs:   Energy Needs:  (MSJ x  PAL) 1263 (1.3 stress factor)   Protein Needs: ( gm Protein/kg) 40-60   Fluid Needs:   (1 mL/calorie)3776-5764 cc   Minimum Fat needs to prevent Fatty acid deficiency - 3 % of calories - 40 grams fat     Nutrition Focused Physical Findings:   Body Fat Depletion: Loss of subcutaneous fat in orbital, triceps, ribs.  Muscle Mass Depletion: Temples (temporalis), clavicles (pectoralis & deltoids), shoulder (deltoids), interosseous muscles, scapula (latissimus dorsi, trapezious, deltoids), thighs (quadriceps) and calf (gastrocnemius)    Nutrition History Reports having followed diet fastidiously at time and then having consumed more free diet ( inclusion of fodmaps, sugar and fat)   Breakfast: oatmeal with granola added   Lunch : Sweet potato, zucchini chicken or salmon    Supper : same     When having received Tube feedings - used a wide variety of brands but most contained beans , dairy, sulphites, oils other than olive , etc   Currently consuming 1 serving of the following oral nutritional supplements : all by [employer] - 500 calories /44 grams protein/16 grams fat      Real Food Ultrainflamx Plus 360  Beverage 170 calories /15 grams protein   Ultra GI Replenish Vanilla 150 calories /12 grams protein   Metagenics Ultrameal Advanced Protein 180 calories /17 grams protein       Real Food Ultrainflamx Plus 360  Beverage  Currently consuming 1 serving of the following oral nutritional supplements : all by [employer] Label & Supplement Facts  Ingredient Amount Daily Value  Information per Serving:      Serving Size 2 Scoops (39 g)     Servings per Container 14     Calories 150    Total Fat 5 g     Saturated Fat 1.5 g     Trans Fat 0 g     Polyunsaturated Fat 1 g     Monounsaturated Fat 2 g     Cholesterol 0 mg     Sodium 100 mg     Potassium 220 mg     Total Carbohydrate 16 g     Dietary  Fiber 7 g     Sugars 4 g     Protein 12 g    Typical Amino Acid Profile per Serving*      Amino Acid mg/Serving     L-Alanine 740 mg    L-Arginine 1,080 mg    L-Aspartic Acid 1,360 mg    L-Cystine (Cysteine) 150 mg    L-Glutamic Acid 2,050 mg    L-Glutamine 310 mg    Glycine 510 mg    L-Histidine** 300 mg    L-Isoleucine (BCAA)** 1,070 mg    L-Leucine (BCAA)** 1,820 mg    L-Lysine** (as L-lysine HCl) 1,650 mg    L-Methionine** 160 mg    L-Phenylalanine** 670 mg    L-Proline 550 mg    L-Serine   640 mg    L-Threonine** 470 mg    L-Tryptophan** 130 mg    L-Tyrosine 490 mg    L-Valine (BCAA)** 1,190 mg    Percentage of Reference Daily Intakes (RDI)      Each Serving Contains:   % RDI   Vitamin A (retinyl palmitate) 1,250 IU  25%  Vitamin C 100 mg  170 %  Calcium 80 mg  8%  Iron 3 mg  15%   Vitamin D3 (as cholecalciferol) 400 IU  100%   Vitamin E 7.5 IU  25%   Thiamin 0.375 mg  25%   Riboflavin 0.425 mg  25%  Niacin 5 mg  25%  Vitamin B6 0.5 mg  25%  Folate (as calcium L-5-methyltetrahydrofolate) 185.2 mcg 45%  Vitamin B12 (as methylcobalamin) 1.5 mcg  25%  Biotin 75 mcg  25%  Pantothenic Acid 2.5 mg  25%  Phosphorus 185 mg  20%  Iodine 37.5 mcg  25%  Magnesium 35 mg  8%  Zinc 11.25 mg  80%  Selenium 52.5 mcg  80%  Copper 1.5 mg  80%  Manganese 1.5 mg  80%  Chromium 120 mcg  100%  Chloride 400 mg  10%  Alpha-Linolenic Acid (ALA) (from flaxseed) 400 mg     L-Alanyl-L-Glutamine 500 mg     Isomalto-Oligosaccharide 5 g     2'-Fucosyllactose (2'-FL) 2 g     Ingredients: Pea protein isolate, isomalto-oligosaccharide, rice protein concentrate, 2-fucosyllactose powder, organic cane sugar, flaxseed, high oleic sunflower oil, natural flavors,^ L-lysine HCl, gum john, L-leucine, medium chain triglycerides, silica, L-valine, L-alanyl-L-glutamine, L-isoleucine, xanthan gum, vitamin and mineral blend (zinc gluconate, ascorbic acid, manganese gluconate, d-alpha tocopheryl acetate, copper gluconate, D-biotin, retinyl  palmitate, niacinamide, cholecalciferol, d-calcium pantothenate, chromium picolinate, pyridoxine HCl, riboflavin, potassium iodide, thiamin HCl, calcium L-5-methyltetrahydrofolate, selenomethionine, and methylcobalamin), and Sage Kevin fruit (monk fruit) extract.    Directions: Blend, shake, or briskly stir 2 level scoops (39 grams) into 8 fluid ounces of chilled water. Take twice daily, or as directed by your healthcare practitioner.    NOTICE: THIS PRODUCT IS TO BE USED UNDER THE DIRECT SUPERVISION OF A PHYSICIAN OR OTHER LICENSED HEALTHCARE PRACTITIONER. DO NOT ENGAGE IN ANY DIET SUPPLYING LESS THAN 800 CALORIES PER DAY WITHOUT MEDICAL SUPERVISION.    This product is non-GMO and gluten free.    CAUTION: This medical food has not been studied in children and pregnant or nursing women. If taking medication or other nutritional supplements, consult your healthcare practitioner before use.    TAMPER EVIDENT: Do not use if package is torn or open.    Storage: Keep closed in a cool, dry place.    Patent pending.    ^^No MSG  * Contributed by pea protein, added amino acids, rice protein, and Sustamine®  ** Essential amino acid   Conditionally essential amino acid  Saturated fat content is contributed by medium chain triglycerides  15 g total protein with added amino acids: L-lysine, L-leucine, L-valine, L-isoleucine, and Sustamine®  *Daily value not established.  Sustamine® is a registered trademark of [employer]        [employer] Ultrameal Advanced Protein   Label & Supplement Facts  Ingredient Amount Per Serving   Information per Serving:      Serving Size About 2 Scoops (46g)    Servings per Container 14    Calories 180     Total Fat 6 g     Saturated Fat 1 g     Trans Fat 0 g     Polyunsaturated Fat 1.5 g     Monounsaturated Fat 3 g     Cholesterol 0 mg     Sodium 170 mg     Potassium 270 mg     Total Carbohydrate 15 g     Dietary Fiber 5 g     Sugars 3 g     Protein 17 g     Typical Amino  Acid Profile per Serving*      Amino Acid mg/Serving    L-Alanine 790 mg     L-Arginine 1520 mg     L-Aspartic Acid 1920 mg     L-Cystine (Cysteine) 210 mg     L-Glutamic Acid 2910 mg     Glycine 720 mg     L-Histidine** 430 mg     L-Isoleucine (BCAA)** 1300 mg     L-Leucine (BCAA)** 2420 mg     L-Lysine** (as L-lysine HCl) 1430 mg     L-Methionine** 230 mg     L-Phenylalanine** 940 mg     L-Proline  770 mg     L-Serine 900 mg     L-Threonine** 1560 mg     L-Tryptophan** 180 mg     L-Tyrosine 700 mg     L-Valine (BCAA)** 1360 mg     Percentage of Reference Daily Intakes (RDI)      Each Serving Contains: Amount Per Serving % RDI  Vitamin A 1250 IU  25%   Vitamin C 30 mg  50%   Calcium 70 mg  8%   Iron 6 mg  30%   Vitamin D 400 IU  100%   Vitamin E 7.5 IU  25%   Thiamin 0.375 mg  25%   Riboflavin 0.425 mg  25%   Niacin 5 mg  25%   Vitamin B6 0.5 mg  25%   Folate (as calcium L-5-methyltetrahydrofolate) 185.2 mcg  45%   Vitamin B12 (as methylcobalamin) 1.5 mcg  25%   Biotin 75 mcg  25%   Pantothenic acid 2.5 mg  25%   Phosphorus 240 mg  25%   Iodine 37.5 mcg  25%   Magnesium 150 mg  40%   Zinc 11.25 mg  80%   Selenium 52.5 mcg  80%   Copper 1.5 mg  80%   Manganese 1.5 mg  80%   Chromium 120 mcg  100%   Chloride 220 mg  6%   Total Polyphenols (from cocoa beans) 100 mg     Alpha-Linolenic Acid (ALA) (from flaxseed) 500 mg     Ingredients: Pea protein isolate, rice protein concentrate, isomalto-oligosaccharide, natural flavors,^ high oleic sunflower oil, flaxseed, organic cane sugar, magnesium citrate, L-leucine, cocoa powder, L-threonine, silica, L-isoleucine, L-valine, L-lysine HCl, guar gum, vitamin and mineral blend (zinc gluconate, ascorbic acid, manganese gluconate, d-alpha tocopheryl acetate, copper gluconate, D-biotin, retinyl palmitate, niacinamide, cholecalciferol, d-calcium pantothenate, chromium picolinate, pyridoxine HCl, riboflavin, potassium iodide, thiamin HCl, calcium L-5-methyltetrahydrofolate,  selenomethionine, and methylcobalamin), Sage Kevin fruit (monk fruit) extract, and xanthan gum.    Directions: Take one serving one to two times daily, or as directed by your healthcare practitioner. Blend, shake, or briskly stir 2 scoops (46 g) into 8-10 ounces of chilled water.    NOTICE: THIS PRODUCT IS TO BE USED UNDER THE DIRECT SUPERVISION OF A PHYSICIAN OR OTHER LICENSED HEALTHCARE PRACTITIONER. DO NOT ENGAGE IN ANY DIET SUPPLYING LESS THAN 800 CALORIES PER DAY WITHOUT MEDICAL SUPERVISION.    This product is non-GMO and gluten free.    Tamper Evident: Do not use if safety seal is missing or broken.    Storage: Keep tightly closed in a cool, dry place. Keep out of reach of children.    ^No MSG    * Contributed by pea protein, added amino acids, rice protein, and cocoa powder  ** Essential amino acid   Conditionally essential amino acid    20 g total protein including added amino acids: L-leucine, L-threonine, L-isoleucine, L-valine, and L-lysine  As Metafolin®. Metafolin® is a registered trademark of [email or web address], [place]  Daily value not established.    Ultra GI Replenish Vanilla   Label & Supplement Facts  Ingredient Amount Daily Value  Information per Serving:      Serving Size 2 Scoops (39 g)     Servings per Container 14     Calories 150    Total Fat 5 g     Saturated Fat 1.5 g     Trans Fat 0 g     Polyunsaturated Fat 1 g     Monounsaturated Fat 2 g     Cholesterol 0 mg     Sodium 100 mg     Potassium 220 mg     Total Carbohydrate 16 g     Dietary Fiber 7 g     Sugars 4 g     Protein 12 g    Typical Amino Acid Profile per Serving*      Amino Acid mg/Serving     L-Alanine 740 mg    L-Arginine 1,080 mg    L-Aspartic Acid 1,360 mg    L-Cystine (Cysteine) 150 mg    L-Glutamic Acid 2,050 mg    L-Glutamine 310 mg    Glycine 510 mg    L-Histidine** 300 mg    L-Isoleucine (BCAA)** 1,070 mg    L-Leucine (BCAA)** 1,820 mg    L-Lysine** (as L-lysine HCl) 1,650 mg    L-Methionine** 160 mg     L-Phenylalanine** 670 mg    L-Proline 550 mg    L-Serine 640 mg    L-Threonine** 470 mg    L-Tryptophan** 130 mg    L-Tyrosine 490 mg    L-Valine (BCAA)** 1,190 mg    Percentage of Reference Daily Intakes (RDI)      Each Serving Contains:   % RDI   Vitamin A (retinyl palmitate) 1,250 IU  25%  Vitamin C 100 mg  170 %  Calcium 80 mg  8%  Iron 3 mg  15%   Vitamin D3 (as cholecalciferol) 400 IU  100%   Vitamin E 7.5 IU  25%   Thiamin 0.375 mg  25%   Riboflavin 0.425 mg  25%  Niacin 5 mg  25%  Vitamin B6 0.5 mg  25%  Folate (as calcium L-5-methyltetrahydrofolate) 185.2 mcg 45%  Vitamin B12 (as methylcobalamin) 1.5 mcg  25%  Biotin 75 mcg  25%  Pantothenic Acid 2.5 mg  25%  Phosphorus 185 mg  20%  Iodine 37.5 mcg  25%  Magnesium 35 mg  8%  Zinc 11.25 mg  80%  Selenium 52.5 mcg  80%  Copper 1.5 mg  80%  Manganese 1.5 mg  80%  Chromium 120 mcg  100%  Chloride 400 mg  10%  Alpha-Linolenic Acid (ALA) (from flaxseed) 400 mg     L-Alanyl-L-Glutamine 500 mg     Isomalto-Oligosaccharide 5 g     2'-Fucosyllactose (2'-FL) 2 g     Ingredients: Pea protein isolate, isomalto-oligosaccharide, rice protein concentrate, 2-fucosyllactose powder, organic cane sugar, flaxseed, high oleic sunflower oil, natural flavors,^ L-lysine HCl, gum john, L-leucine, medium chain triglycerides, silica, L-valine, L-alanyl-L-glutamine, L-isoleucine, xanthan gum, vitamin and mineral blend (zinc gluconate, ascorbic acid, manganese gluconate, d-alpha tocopheryl acetate, copper gluconate, D-biotin, retinyl palmitate, niacinamide, cholecalciferol, d-calcium pantothenate, chromium picolinate, pyridoxine HCl, riboflavin, potassium iodide, thiamin HCl, calcium L-5-methyltetrahydrofolate, selenomethionine, and methylcobalamin), and Sage Kevin fruit (monk fruit) extract.    Directions: Blend, shake, or briskly stir 2 level scoops (39 grams) into 8 fluid ounces of chilled water. Take twice daily, or as directed by your healthcare  practitioner.    NOTICE: THIS PRODUCT IS TO BE USED UNDER THE DIRECT SUPERVISION OF A PHYSICIAN OR OTHER LICENSED HEALTHCARE PRACTITIONER. DO NOT ENGAGE IN ANY DIET SUPPLYING LESS THAN 800 CALORIES PER DAY WITHOUT MEDICAL SUPERVISION.    This product is non-GMO and gluten free.    CAUTION: This medical food has not been studied in children and pregnant or nursing women. If taking medication or other nutritional supplements, consult your healthcare practitioner before use.    TAMPER EVIDENT: Do not use if package is torn or open.    Storage: Keep closed in a cool, dry place.    Patent pending.    ^^No MSG  * Contributed by pea protein, added amino acids, rice protein, and Sustamine®  ** Essential amino acid   Conditionally essential amino acid  Saturated fat content is contributed by medium chain triglycerides  15 g total protein with added amino acids: L-lysine, L-leucine, L-valine, L-isoleucine, and Sustamine®  *Daily value not established.  Sustamine® is a registered trademark of [employer]      Meal Preparation: mother or self   Dining Out: rarely     Dentition: Difficulty chewing or swallowing?   Activity Level/Physical Limitations : limited by joint pain     Motivation to make Changes :   action - ready to set action plan and implement     Anticipated barriers to making changes and/ or expected compliance   None except for exposure to foods which trigger reactions         Nutrition Diagnosis  Nutrition Problem  Altered GI Function  Inadequate energy intake and Inadequate vitamin intake  Related to (etiology):   Pancreatic insufficiency   Scleroderma   Corticosteroid dependence   Signs and Symptoms (as evidenced by):    Tachycardia with intake of listed intolerances  Dumping syndrome with fat and sugar intake    symptoms are clearly aggravated by stressful situations.    Nutrition Diagnosis Status:   New      Recommendations/Interventions/Goals:  1. Use of oral supplements to provide 50% of  calorie/protein needs - in progress   2. Supplemental vits and minerals to address deficiencies - in progress ; recheck levels in one month to evaluate continued need for supplements - Multivitamin mineral may provide safety net in future   3. Guidance with respect to pancreatic enzyme dosage -- check of levels - lipase , etc ?   4. Compliance with food intolerances more consistently now that will be consuming meals at home   5. Adequate fluid intake   6. Ongoing support of Therapist who has made significant progress - EMDR has been effective       Comprehension: of recommendations : Excellent , patient is active in her self care and has great insight into food tolerances and triggers       Monitoring :   Weight, prealbumin, repeat of recently checked vits/minerals ; pancreatic enzyme check     Routed to Pietro Watts     Consultation Time:90 minutes.    Follow Up:1 months, my chart

## 2018-12-18 NOTE — TELEPHONE ENCOUNTER
Pt will let us know when ok to proceed per cardiology       Dr. Watts,     Thank you for getting back with me. I am attaching the final stool samples collected from my stay at [facility].  Right now my cardiologist does not recommend any invasive procedures at this time due to being treated for resolving blood clot in my R arm and 2 small blood clots in my lungs. I will be on a blood thinner for the next 3 months.     I did meet with the nutritionist today and I will schedule to see the allergist. I am scheduled to see my counselor this upcoming Saturday.

## 2019-01-07 ENCOUNTER — TELEPHONE (OUTPATIENT)
Dept: ENDOSCOPY | Facility: HOSPITAL | Age: 44
End: 2019-01-07

## 2019-01-21 ENCOUNTER — TELEPHONE (OUTPATIENT)
Dept: GASTROENTEROLOGY | Facility: CLINIC | Age: 44
End: 2019-01-21

## 2019-01-28 ENCOUNTER — OFFICE VISIT (OUTPATIENT)
Dept: URGENT CARE | Facility: CLINIC | Age: 44
End: 2019-01-28
Payer: MEDICARE

## 2019-01-28 VITALS
HEART RATE: 91 BPM | SYSTOLIC BLOOD PRESSURE: 111 MMHG | BODY MASS INDEX: 14.35 KG/M2 | HEIGHT: 62 IN | RESPIRATION RATE: 18 BRPM | DIASTOLIC BLOOD PRESSURE: 79 MMHG | WEIGHT: 78 LBS | TEMPERATURE: 98 F | OXYGEN SATURATION: 99 %

## 2019-01-28 DIAGNOSIS — R50.9 FEVER, UNSPECIFIED FEVER CAUSE: ICD-10-CM

## 2019-01-28 DIAGNOSIS — B34.9 ACUTE VIRAL SYNDROME: Primary | ICD-10-CM

## 2019-01-28 DIAGNOSIS — S31.109S OPEN ABDOMINAL WALL WOUND, SEQUELA: ICD-10-CM

## 2019-01-28 DIAGNOSIS — M79.10 MYALGIA: ICD-10-CM

## 2019-01-28 LAB
CTP QC/QA: YES
FLUAV AG NPH QL: NEGATIVE
FLUBV AG NPH QL: NEGATIVE

## 2019-01-28 PROCEDURE — 87804 POCT INFLUENZA A/B: ICD-10-PCS | Mod: QW,S$GLB,, | Performed by: EMERGENCY MEDICINE

## 2019-01-28 PROCEDURE — 3008F BODY MASS INDEX DOCD: CPT | Mod: CPTII,S$GLB,, | Performed by: EMERGENCY MEDICINE

## 2019-01-28 PROCEDURE — 87804 INFLUENZA ASSAY W/OPTIC: CPT | Mod: QW,S$GLB,, | Performed by: EMERGENCY MEDICINE

## 2019-01-28 PROCEDURE — 99214 OFFICE O/P EST MOD 30 MIN: CPT | Mod: 25,S$GLB,, | Performed by: EMERGENCY MEDICINE

## 2019-01-28 PROCEDURE — 96372 THER/PROPH/DIAG INJ SC/IM: CPT | Mod: S$GLB,,, | Performed by: EMERGENCY MEDICINE

## 2019-01-28 PROCEDURE — 96372 PR INJECTION,THERAP/PROPH/DIAG2ST, IM OR SUBCUT: ICD-10-PCS | Mod: S$GLB,,, | Performed by: EMERGENCY MEDICINE

## 2019-01-28 PROCEDURE — 3008F PR BODY MASS INDEX (BMI) DOCUMENTED: ICD-10-PCS | Mod: CPTII,S$GLB,, | Performed by: EMERGENCY MEDICINE

## 2019-01-28 PROCEDURE — 99214 PR OFFICE/OUTPT VISIT, EST, LEVL IV, 30-39 MIN: ICD-10-PCS | Mod: 25,S$GLB,, | Performed by: EMERGENCY MEDICINE

## 2019-01-28 RX ORDER — MUPIROCIN 20 MG/G
OINTMENT TOPICAL
Qty: 22 G | Refills: 1 | Status: SHIPPED | OUTPATIENT
Start: 2019-01-28 | End: 2021-01-01 | Stop reason: CLARIF

## 2019-01-28 RX ORDER — KETOROLAC TROMETHAMINE 30 MG/ML
30 INJECTION, SOLUTION INTRAMUSCULAR; INTRAVENOUS
Status: COMPLETED | OUTPATIENT
Start: 2019-01-28 | End: 2019-01-28

## 2019-01-28 RX ADMIN — KETOROLAC TROMETHAMINE 30 MG: 30 INJECTION, SOLUTION INTRAMUSCULAR; INTRAVENOUS at 01:01

## 2019-01-28 NOTE — PROGRESS NOTES
"Subjective:       Patient ID: Jewels Byrnes is a 43 y.o. female.    Vitals:  height is 5' 2" (1.575 m) and weight is 35.4 kg (78 lb). Her temperature is 98.2 °F (36.8 °C). Her blood pressure is 111/79 and her pulse is 91. Her respiration is 18 and oxygen saturation is 99%.     Chief Complaint: Fever (fever, body aches. Pt staes that she has an open wound from feeding tube that is currently oozing)    Patient reports that she normally runs around 97.5 and that for 24-36 hours she has had a temp of .5 and severe myalgias and also headache, all of which resolved nearly completely with antipyretic tylenol, and the pains throughout have completely resolved with the tramadol that she already has . She denies any complaints of ear pain, sore throat, urinary, nausea, vomiting, diarrhea. She already took a home uti test and it was completely normal. She states with her hx she wanted to make sure she didn't have the flu./ she also had feeding tube removed 2 weeks ago and states that she has occasional drainage from hole/residual stoma of the left mid/lateral left abdominal wall, and she didn't know if this was cause of "fever". It is not bleeding nor draining now, and it never has been draining pus nor bleeding.      Fever    This is a new problem. The current episode started in the past 7 days. The problem occurs constantly. The problem has been unchanged. The temperature was taken using an oral thermometer. Pertinent negatives include no congestion, coughing, ear pain, nausea, rash, sore throat, vomiting or wheezing. Associated symptoms comments: Fever, body aches  . Treatments tried: Tylenol. The treatment provided no relief.       Constitution: Positive for fever. Negative for chills, sweating and fatigue.   HENT: Negative for ear pain, congestion, sinus pain, sinus pressure, sore throat and voice change.    Neck: Negative for painful lymph nodes.   Eyes: Negative for eye redness.   Respiratory: Negative for chest " tightness, cough, sputum production, bloody sputum, COPD, shortness of breath, stridor, wheezing and asthma.    Gastrointestinal: Negative for nausea and vomiting.   Musculoskeletal: Negative for muscle ache.   Skin: Negative for rash.   Allergic/Immunologic: Negative for seasonal allergies and asthma.   Hematologic/Lymphatic: Negative for swollen lymph nodes.       Objective:      Physical Exam   Constitutional: She is oriented to person, place, and time. She appears well-developed. She is cooperative.  Non-toxic appearance. She does not appear ill. No distress.   Thin cachectic, hx feeding issues, scleroderma, weighs 78 pounds, chronic illness getting better.   HENT:   Head: Normocephalic and atraumatic.   Right Ear: Hearing, tympanic membrane, external ear and ear canal normal.   Left Ear: Hearing, tympanic membrane, external ear and ear canal normal.   Nose: Nose normal. No mucosal edema, rhinorrhea or nasal deformity. No epistaxis. Right sinus exhibits no maxillary sinus tenderness and no frontal sinus tenderness. Left sinus exhibits no maxillary sinus tenderness and no frontal sinus tenderness.   Mouth/Throat: Uvula is midline, oropharynx is clear and moist and mucous membranes are normal. No trismus in the jaw. Normal dentition. No uvula swelling. No posterior oropharyngeal erythema.   Normal op, nares, tm clear   Eyes: Conjunctivae and lids are normal. No scleral icterus.   Sclera clear bilat   Neck: Trachea normal, full passive range of motion without pain and phonation normal. Neck supple.   Cardiovascular: Normal rate, regular rhythm, normal heart sounds, intact distal pulses and normal pulses.   Pulmonary/Chest: Effort normal and breath sounds normal. No respiratory distress.   Abdominal: Soft. Normal appearance and bowel sounds are normal. She exhibits no distension and no mass. There is no tenderness. There is no rebound and no guarding. No hernia.   Left paramedian abd wall soft with stoma from prior  tube feeding with occasional drainage of fluid not now. No actual abd quadrant ttp in the abd. No distention, abd soft.   Musculoskeletal: Normal range of motion. She exhibits no edema or deformity.   Neurological: She is alert and oriented to person, place, and time. She exhibits normal muscle tone. Coordination normal.   Skin: Skin is warm, dry and intact. She is not diaphoretic. No pallor.   Psychiatric: She has a normal mood and affect. Her speech is normal and behavior is normal. Cognition and memory are normal.   Nursing note and vitals reviewed.        Office Visit on 01/28/2019   Component Date Value Ref Range Status    Rapid Influenza A Ag 01/28/2019 Negative  Negative Final    Rapid Influenza B Ag 01/28/2019 Negative  Negative Final     Acceptable 01/28/2019 Yes   Final       Assessment:       1. Acute viral syndrome    2. Myalgia    3. Fever, unspecified fever cause    4. Open abdominal wall wound, sequela        Plan:         Acute viral syndrome    Myalgia    Fever, unspecified fever cause  -     POCT Influenza A/B    Open abdominal wall wound, sequela    Other orders  -     ketorolac injection 30 mg  -     mupirocin (BACTROBAN) 2 % ointment; Apply to affected area 3 times daily  Dispense: 22 g; Refill: 1          Patient Instructions   30 mg toradol given in clinic  Take tylenol 500 mg every 4 hours or 650 mg every 6 hours scheduled for fever/aches  Ok to take Tramadol on top of the tylenol as you have been doing for pain/aches  Also ok to take the muscle relaxant that you have for muscle spasms/pains  Flu swab negative  Follow up with PCP for further workup if having specific symptoms of persistent cuogh, urinary symptoms, sore throat, skin lesions or abdominal pain, nausea/vomiting, or other constellation of symptoms.  Bactroban ointment to the abdominal wall wound    Review Acute Viral Syndrome Sheet      Febrile Illness, Uncertain Cause (Adult)  You have a fever, but the cause is  not certain. A fever is a natural reaction of the body to an illness such as infection due to a virus or bacteria. In most cases, the temperature itself is not harmful. It actually helps the body fight infections. A fever does not need to be treated unless you feel very uncomfortable.  Sometimes a fever can be an early sign of a more serious infection, so make sure to follow up if your condition worsens.  Home care  Unless given other instructions by your healthcare provider, follow these guidelines when caring for yourself at home.  General care  · If your symptoms are severe, rest at home for the first 2 to 3 days. When you resume activity, don't let yourself get too tired.  · Do not smoke. Also avoid being exposed to secondhand smoke.  · Your appetite may be poor, so a light diet is fine. Avoid dehydration by drinking 6 to 8 glasses of fluids per day (such as water, soft drinks, sports drinks, juices, tea, or soup). Extra fluids will help loosen secretions in the nose and lungs.  Medicines  · You can take acetaminophen or ibuprofen for pain, unless you were given a different fever-reducing/pain medicine to use. (Note: If you have chronic liver or kidney disease or have ever had a stomach ulcer or gastrointestinal bleeding, talk with your healthcare provider before using these medicines. Also talk to your provider if you are taking medicine to prevent blood clots.) Aspirin should never be given to anyone younger than 18 years of age who is ill with a viral infection or fever. It may cause severe liver or brain damage.  · If you were given antibiotics, take them until they are used up, or your healthcare provider tells you to stop. It is important to finish the antibiotics even though you feel better. This is to make sure the infection has cleared. Be aware that antibiotics are not usually given for a fever with an unknown cause.  · Over-the-counter medicines will not shorten the duration of the illness. However,  they may be helpful for the following symptoms: cough, sore throat, or nasal and sinus congestion. Ask your pharmacist for product suggestions. (Note: Do not use decongestants if you have high blood pressure.)  Follow-up care  Follow up with your healthcare provider, or as advised.  · If a culture was done, you will be notified if your treatment needs to be changed. You can call as directed for the results.  · If X-rays, a CT, or an ultrasound were done, a specialist will review them. You will be notified of any findings that may affect your care.  Call 911  Contact emergency services right away if any of these occur:  · Trouble breathing or swallowing, or wheezing  · Chest pain  · Confusion  · Extreme drowsiness or trouble awakening  · Fainting or loss of consciousness  · Rapid heart rate  · Low blood pressure  · Vomiting blood, or large amounts of blood in stool  · Seizure  When to seek medical advice  Call your healthcare provider right away if any of these occur:  · Cough with lots of colored sputum (mucus) or blood in your sputum  · Severe headache  · Face, neck, throat, or ear pain  · Feeling drowsy  · Abdominal pain  · Repeated vomiting or diarrhea  · Joint pain or a new rash  · Burning when urinating  · Fever of 100.4°F (38°C) or higher, that does not get better after taking fever-reducing medicine  · Feeling weak or dizzy  Date Last Reviewed: 7/30/2015  © 3827-4245 [employer]. [address] [place] [address]. All rights reserved. This information is not intended as a substitute for professional medical care. Always follow your healthcare professional's instructions.        Viral Syndrome (Adult)  A viral illness may cause a number of symptoms. The symptoms depend on the part of the body that the virus affects. If it settles in your nose, throat, and lungs, it may cause cough, sore throat, congestion, and sometimes headache. If it settles in your stomach and intestinal tract, it may  "cause vomiting and diarrhea. Sometimes it causes vague symptoms like "aching all over," feeling tired, loss of appetite, or fever.  A viral illness usually lasts 1 to 2 weeks, but sometimes it lasts longer. In some cases, a more serious infection can look like a viral syndrome in the first few days of the illness. You may need another exam and additional tests to know the difference. Watch for the warning signs listed below.  Home care  Follow these guidelines for taking care of yourself at home:  · If symptoms are severe, rest at home for the first 2 to 3 days.  · Stay away from cigarette smoke - both your smoke and the smoke from others.  · You may use over-the-counter acetaminophen or ibuprofen for fever, muscle aching, and headache, unless another medicine was prescribed for this. If you have chronic liver or kidney disease or ever had a stomach ulcer or GI bleeding, talk with your doctor before using these medicines. No one who is younger than 18 and ill with a fever should take aspirin. It may cause severe disease or death.  · Your appetite may be poor, so a light diet is fine. Avoid dehydration by drinking 8 to 12 8-ounce glasses of fluids each day. This may include water; orange juice; lemonade; apple, grape, and cranberry juice; clear fruit drinks; electrolyte replacement and sports drinks; and decaffeinated teas and coffee. If you have been diagnosed with a kidney disease, ask your doctor how much and what types of fluids you should drink to prevent dehydration. If you have kidney disease, drinking too much fluid can cause it build up in the your body and be dangerous to your health.  · Over-the-counter remedies won't shorten the length of the illness but may be helpful for cough, sore throat; and nasal and sinus congestion. Don't use decongestants if you have high blood pressure.  Follow-up care  Follow up with your healthcare provider if you do not improve over the next week.  Call 911  Get emergency " medical care if any of the following occur:  · Convulsion  · Feeling weak, dizzy, or like you are going to faint  · Chest pain, shortness of breath, wheezing, or difficulty breathing  When to seek medical advice  Call your healthcare provider right away if any of these occur:  · Cough with lots of colored sputum (mucus) or blood in your sputum  · Chest pain, shortness of breath, wheezing, or difficulty breathing  · Severe headache; face, neck, or ear pain  · Severe, constant pain in the lower right side of your belly (abdominal)  · Continued vomiting (cant keep liquids down)  · Frequent diarrhea (more than 5 times a day); blood (red or black color) or mucus in diarrhea  · Feeling weak, dizzy, or like you are going to faint  · Extreme thirst  · Fever of 100.4°F (38°C) or higher, or as directed by your healthcare provider  Date Last Reviewed: 9/25/2015  © 9923-8231 [employer]. [address] [place] [address], [place] [address]. All rights reserved. This information is not intended as a substitute for professional medical care. Always follow your healthcare professional's instructions.

## 2019-01-28 NOTE — PATIENT INSTRUCTIONS
30 mg toradol given in clinic  Take tylenol 500 mg every 4 hours or 650 mg every 6 hours scheduled for fever/aches  Ok to take Tramadol on top of the tylenol as you have been doing for pain/aches  Also ok to take the muscle relaxant that you have for muscle spasms/pains  Flu swab negative  Follow up with PCP for further workup if having specific symptoms of persistent cuogh, urinary symptoms, sore throat, skin lesions or abdominal pain, nausea/vomiting, or other constellation of symptoms.  Bactroban ointment to the abdominal wall wound    Review Acute Viral Syndrome Sheet      Febrile Illness, Uncertain Cause (Adult)  You have a fever, but the cause is not certain. A fever is a natural reaction of the body to an illness such as infection due to a virus or bacteria. In most cases, the temperature itself is not harmful. It actually helps the body fight infections. A fever does not need to be treated unless you feel very uncomfortable.  Sometimes a fever can be an early sign of a more serious infection, so make sure to follow up if your condition worsens.  Home care  Unless given other instructions by your healthcare provider, follow these guidelines when caring for yourself at home.  General care  · If your symptoms are severe, rest at home for the first 2 to 3 days. When you resume activity, don't let yourself get too tired.  · Do not smoke. Also avoid being exposed to secondhand smoke.  · Your appetite may be poor, so a light diet is fine. Avoid dehydration by drinking 6 to 8 glasses of fluids per day (such as water, soft drinks, sports drinks, juices, tea, or soup). Extra fluids will help loosen secretions in the nose and lungs.  Medicines  · You can take acetaminophen or ibuprofen for pain, unless you were given a different fever-reducing/pain medicine to use. (Note: If you have chronic liver or kidney disease or have ever had a stomach ulcer or gastrointestinal bleeding, talk with your healthcare provider before  using these medicines. Also talk to your provider if you are taking medicine to prevent blood clots.) Aspirin should never be given to anyone younger than 18 years of age who is ill with a viral infection or fever. It may cause severe liver or brain damage.  · If you were given antibiotics, take them until they are used up, or your healthcare provider tells you to stop. It is important to finish the antibiotics even though you feel better. This is to make sure the infection has cleared. Be aware that antibiotics are not usually given for a fever with an unknown cause.  · Over-the-counter medicines will not shorten the duration of the illness. However, they may be helpful for the following symptoms: cough, sore throat, or nasal and sinus congestion. Ask your pharmacist for product suggestions. (Note: Do not use decongestants if you have high blood pressure.)  Follow-up care  Follow up with your healthcare provider, or as advised.  · If a culture was done, you will be notified if your treatment needs to be changed. You can call as directed for the results.  · If X-rays, a CT, or an ultrasound were done, a specialist will review them. You will be notified of any findings that may affect your care.  Call 911  Contact emergency services right away if any of these occur:  · Trouble breathing or swallowing, or wheezing  · Chest pain  · Confusion  · Extreme drowsiness or trouble awakening  · Fainting or loss of consciousness  · Rapid heart rate  · Low blood pressure  · Vomiting blood, or large amounts of blood in stool  · Seizure  When to seek medical advice  Call your healthcare provider right away if any of these occur:  · Cough with lots of colored sputum (mucus) or blood in your sputum  · Severe headache  · Face, neck, throat, or ear pain  · Feeling drowsy  · Abdominal pain  · Repeated vomiting or diarrhea  · Joint pain or a new rash  · Burning when urinating  · Fever of 100.4°F (38°C) or higher, that does not get  "better after taking fever-reducing medicine  · Feeling weak or dizzy  Date Last Reviewed: 7/30/2015  © 5985-5121 [employer]. [address] [place] [address], [place] [address]. All rights reserved. This information is not intended as a substitute for professional medical care. Always follow your healthcare professional's instructions.        Viral Syndrome (Adult)  A viral illness may cause a number of symptoms. The symptoms depend on the part of the body that the virus affects. If it settles in your nose, throat, and lungs, it may cause cough, sore throat, congestion, and sometimes headache. If it settles in your stomach and intestinal tract, it may cause vomiting and diarrhea. Sometimes it causes vague symptoms like "aching all over," feeling tired, loss of appetite, or fever.  A viral illness usually lasts 1 to 2 weeks, but sometimes it lasts longer. In some cases, a more serious infection can look like a viral syndrome in the first few days of the illness. You may need another exam and additional tests to know the difference. Watch for the warning signs listed below.  Home care  Follow these guidelines for taking care of yourself at home:  · If symptoms are severe, rest at home for the first 2 to 3 days.  · Stay away from cigarette smoke - both your smoke and the smoke from others.  · You may use over-the-counter acetaminophen or ibuprofen for fever, muscle aching, and headache, unless another medicine was prescribed for this. If you have chronic liver or kidney disease or ever had a stomach ulcer or GI bleeding, talk with your doctor before using these medicines. No one who is younger than 18 and ill with a fever should take aspirin. It may cause severe disease or death.  · Your appetite may be poor, so a light diet is fine. Avoid dehydration by drinking 8 to 12 8-ounce glasses of fluids each day. This may include water; orange juice; lemonade; apple, grape, and cranberry juice; clear fruit drinks; " electrolyte replacement and sports drinks; and decaffeinated teas and coffee. If you have been diagnosed with a kidney disease, ask your doctor how much and what types of fluids you should drink to prevent dehydration. If you have kidney disease, drinking too much fluid can cause it build up in the your body and be dangerous to your health.  · Over-the-counter remedies won't shorten the length of the illness but may be helpful for cough, sore throat; and nasal and sinus congestion. Don't use decongestants if you have high blood pressure.  Follow-up care  Follow up with your healthcare provider if you do not improve over the next week.  Call 911  Get emergency medical care if any of the following occur:  · Convulsion  · Feeling weak, dizzy, or like you are going to faint  · Chest pain, shortness of breath, wheezing, or difficulty breathing  When to seek medical advice  Call your healthcare provider right away if any of these occur:  · Cough with lots of colored sputum (mucus) or blood in your sputum  · Chest pain, shortness of breath, wheezing, or difficulty breathing  · Severe headache; face, neck, or ear pain  · Severe, constant pain in the lower right side of your belly (abdominal)  · Continued vomiting (cant keep liquids down)  · Frequent diarrhea (more than 5 times a day); blood (red or black color) or mucus in diarrhea  · Feeling weak, dizzy, or like you are going to faint  · Extreme thirst  · Fever of 100.4°F (38°C) or higher, or as directed by your healthcare provider  Date Last Reviewed: 9/25/2015  © 0411-8288 [employer]. [address] [place] [address], [place] [address]. All rights reserved. This information is not intended as a substitute for professional medical care. Always follow your healthcare professional's instructions.

## 2019-03-20 ENCOUNTER — TELEPHONE (OUTPATIENT)
Dept: ENDOSCOPY | Facility: HOSPITAL | Age: 44
End: 2019-03-20

## 2019-03-20 NOTE — TELEPHONE ENCOUNTER
"Patient contacted to schedule EGD and colonoscopy.  She stated-"I'm still on a blood thinner and not able to have it done. Thank you." And hung up.    "

## 2019-05-23 ENCOUNTER — TELEPHONE (OUTPATIENT)
Dept: ENDOSCOPY | Facility: HOSPITAL | Age: 44
End: 2019-05-23

## 2020-02-22 ENCOUNTER — OFFICE VISIT (OUTPATIENT)
Dept: URGENT CARE | Facility: CLINIC | Age: 45
End: 2020-02-22
Payer: MEDICARE

## 2020-02-22 VITALS
DIASTOLIC BLOOD PRESSURE: 91 MMHG | SYSTOLIC BLOOD PRESSURE: 118 MMHG | HEIGHT: 62 IN | TEMPERATURE: 98 F | OXYGEN SATURATION: 97 % | HEART RATE: 97 BPM | WEIGHT: 80 LBS | BODY MASS INDEX: 14.72 KG/M2

## 2020-02-22 DIAGNOSIS — R53.83 FATIGUE, UNSPECIFIED TYPE: Primary | ICD-10-CM

## 2020-02-22 DIAGNOSIS — B34.9 VIRAL SYNDROME: ICD-10-CM

## 2020-02-22 LAB
CTP QC/QA: YES
CTP QC/QA: YES
FLUAV AG NPH QL: NEGATIVE
FLUBV AG NPH QL: NEGATIVE
HETEROPH AB SER QL: NEGATIVE

## 2020-02-22 PROCEDURE — 87804 INFLUENZA ASSAY W/OPTIC: CPT | Mod: QW,S$GLB,, | Performed by: PHYSICIAN ASSISTANT

## 2020-02-22 PROCEDURE — 99214 OFFICE O/P EST MOD 30 MIN: CPT | Mod: 25,S$GLB,, | Performed by: PHYSICIAN ASSISTANT

## 2020-02-22 PROCEDURE — 86308 POCT INFECTIOUS MONONUCLEOSIS: ICD-10-PCS | Mod: QW,S$GLB,, | Performed by: PHYSICIAN ASSISTANT

## 2020-02-22 PROCEDURE — 99214 PR OFFICE/OUTPT VISIT, EST, LEVL IV, 30-39 MIN: ICD-10-PCS | Mod: 25,S$GLB,, | Performed by: PHYSICIAN ASSISTANT

## 2020-02-22 PROCEDURE — 96372 THER/PROPH/DIAG INJ SC/IM: CPT | Mod: S$GLB,,, | Performed by: PHYSICIAN ASSISTANT

## 2020-02-22 PROCEDURE — 96372 PR INJECTION,THERAP/PROPH/DIAG2ST, IM OR SUBCUT: ICD-10-PCS | Mod: S$GLB,,, | Performed by: PHYSICIAN ASSISTANT

## 2020-02-22 PROCEDURE — 86308 HETEROPHILE ANTIBODY SCREEN: CPT | Mod: QW,S$GLB,, | Performed by: PHYSICIAN ASSISTANT

## 2020-02-22 PROCEDURE — 87804 POCT INFLUENZA A/B: ICD-10-PCS | Mod: QW,S$GLB,, | Performed by: PHYSICIAN ASSISTANT

## 2020-02-22 RX ORDER — KETOROLAC TROMETHAMINE 30 MG/ML
30 INJECTION, SOLUTION INTRAMUSCULAR; INTRAVENOUS
Status: COMPLETED | OUTPATIENT
Start: 2020-02-22 | End: 2020-02-22

## 2020-02-22 RX ORDER — POTASSIUM CHLORIDE 20 MEQ/1
TABLET, EXTENDED RELEASE ORAL
COMMUNITY
Start: 2020-02-12 | End: 2021-01-01 | Stop reason: CLARIF

## 2020-02-22 RX ADMIN — KETOROLAC TROMETHAMINE 30 MG: 30 INJECTION, SOLUTION INTRAMUSCULAR; INTRAVENOUS at 06:02

## 2020-02-22 NOTE — PROGRESS NOTES
"Subjective:       Patient ID: Jewels Byrnes is a 44 y.o. female.    Vitals:  height is 5' 2" (1.575 m) and weight is 36.3 kg (80 lb). Her temperature is 97.8 °F (36.6 °C). Her blood pressure is 118/91 (abnormal) and her pulse is 97. Her oxygen saturation is 97%.     Chief Complaint: URI (elevated temperature, severe fatigue, myalgia, sinusitis with ear fullness )    Patient with a history of scleroderma immunocompromised on prednisone 15 mg q.d. presents with 5 days of elevated temperature (max temperature 99.5° F), fatigue, body aches, post nasal drip, nasal congestion, with left ear fullness.  She states that she has been extremely tired, very fatigued.  She states that people at work have been sick, has been exposed to mono.  Patient denies any abdominal pain, changes in BM, vomiting.  No urinary symptoms or flank pain.    URI    This is a new problem. The current episode started in the past 7 days. The problem has been gradually worsening. The maximum temperature recorded prior to her arrival was 100.4 - 100.9 F. The fever has been present for 1 to 2 days. Associated symptoms include congestion, ear pain (fullness), headaches, nausea and a plugged ear sensation. Pertinent negatives include no abdominal pain, chest pain, coughing, diarrhea, dysuria, sinus pain, sore throat or vomiting. She has tried nothing for the symptoms. The treatment provided no relief.       Constitution: Positive for activity change, fatigue and fever. Negative for appetite change, chills, sweating, unexpected weight change, generalized weakness and international travel in last 60 days.   HENT: Positive for ear pain (fullness), congestion, postnasal drip and sinus pressure. Negative for ear discharge, nosebleeds, foreign body in nose, sinus pain, sore throat, trouble swallowing and voice change.    Neck: Negative for neck stiffness, painful lymph nodes and degenerative disc disease.   Cardiovascular: Negative for chest pain, palpitations " and sob on exertion.   Respiratory: Negative for chest tightness, cough and shortness of breath.    Gastrointestinal: Positive for nausea. Negative for abdominal pain, abdominal bloating, history of abdominal surgery, vomiting, constipation and diarrhea.   Genitourinary: Negative for dysuria, frequency, urgency, urine decreased and flank pain.   Musculoskeletal: Positive for muscle ache. Negative for pain, arthritis and gout.   Skin: Negative for color change.   Neurological: Positive for headaches. Negative for dizziness, history of vertigo, light-headedness, passing out, speech difficulty, disorientation, altered mental status, numbness, tingling and seizures.   Hematologic/Lymphatic: Negative for swollen lymph nodes.   Psychiatric/Behavioral: Negative for altered mental status and disorientation.       Objective:      Physical Exam   Constitutional: She is oriented to person, place, and time. She appears well-developed and well-nourished. She is cooperative.  Non-toxic appearance. She does not have a sickly appearance. She does not appear ill. No distress.   Patient sitting comfortably in no acute distress.  Nontoxic appearing.  Patient is very thin and underweight.   HENT:   Head: Normocephalic and atraumatic.   Right Ear: Hearing, tympanic membrane, external ear and ear canal normal.   Left Ear: Hearing, tympanic membrane, external ear and ear canal normal.   Nose: Mucosal edema and rhinorrhea present. No purulent discharge or nasal deformity. No epistaxis. Right sinus exhibits no maxillary sinus tenderness and no frontal sinus tenderness. Left sinus exhibits no maxillary sinus tenderness and no frontal sinus tenderness.   Mouth/Throat: Uvula is midline, oropharynx is clear and moist and mucous membranes are normal. No trismus in the jaw. Normal dentition. No uvula swelling. No oropharyngeal exudate, posterior oropharyngeal edema or posterior oropharyngeal erythema. Tonsils are 1+ on the right. Tonsils are 1+  on the left. No tonsillar exudate.   Eyes: Conjunctivae and lids are normal. No scleral icterus.   Neck: Trachea normal, full passive range of motion without pain and phonation normal. Neck supple. No neck rigidity. No edema and no erythema present.   Cardiovascular: Normal rate, regular rhythm, normal heart sounds, intact distal pulses and normal pulses.   Pulmonary/Chest: Effort normal and breath sounds normal. No accessory muscle usage or stridor. No tachypnea and no bradypnea. No respiratory distress. She has no decreased breath sounds. She has no wheezes. She has no rhonchi. She has no rales.   Abdominal: Soft. Normal appearance and bowel sounds are normal. She exhibits no distension, no abdominal bruit, no pulsatile midline mass and no mass. There is no tenderness. There is no rigidity, no rebound, no guarding, no CVA tenderness, no tenderness at McBurney's point and negative Bhardwaj's sign.   Musculoskeletal: Normal range of motion. She exhibits no edema or deformity.   Lymphadenopathy:        Head (right side): No submandibular, no preauricular and no posterior auricular adenopathy present.        Head (left side): No submandibular, no preauricular and no posterior auricular adenopathy present.     She has no cervical adenopathy.   Neurological: She is alert and oriented to person, place, and time. She has normal strength. She exhibits normal muscle tone. Coordination normal.   Skin: Skin is warm, dry, intact, not diaphoretic and not pale.   Skin changes consistent with scleroderma   Psychiatric: She has a normal mood and affect. Her speech is normal and behavior is normal. Judgment and thought content normal. Cognition and memory are normal.   Nursing note and vitals reviewed.      Results for orders placed or performed in visit on 02/22/20   POCT Influenza A/B   Result Value Ref Range    Rapid Influenza A Ag Negative Negative    Rapid Influenza B Ag Negative Negative     Acceptable Yes     POCT Infectious mononucleosis antibody   Result Value Ref Range    Monospot Negative Negative     Acceptable Yes      Physical exam is otherwise unremarkable for any significant acute abnormality or red flags.  Vital signs are reassuring.  She has no evidence of bacterial sinusitis at this time.  No AOM.  No abdominal pain or urinary symptoms.  This is consistent with a viral URI.  Because patient is immunocompromised, it will likely take her longer to get over the viral syndrome.  I instructed for her to monitor for signs of secondary infection or complication from viral illness.  At this time I do not see any signs of secondary infection. Instructed close follow up with pcp, er precautions discussed.      Assessment:       1. Fatigue, unspecified type    2. Viral syndrome        Plan:         Fatigue, unspecified type  -     POCT Influenza A/B  -     POCT Infectious mononucleosis antibody    Viral syndrome  -     ketorolac injection 30 mg      Patient Instructions   - Rest.    - Drink plenty of fluids.      - Viral upper respiratory infections typically run their course in 10-14 days.     - Tylenol or Ibuprofen as directed as needed for fever/pain. Avoid tylenol if you have a history of liver disease. Do not take ibuprofen if you have a history of GI bleeding, kidney disease, or if you take blood thinners.   - do not take ibuprofen until 8 hr from now because you got a shot in clinic today    - You can take over-the-counter claritin, zyrtec, allegra, or xyzal as directed. These are antihistamines that can help with runny nose, nasal congestion, sneezing, and helps to dry up post-nasal drip, which usually causes sore throat and cough.   - If you do NOT have high blood pressure, you may use a decongestant form (D)  of this medication or if you do not take the D form, you can take sudafed  (pseudoephedrine) over the counter, which is a decongestant.    - You can use Flonase (fluticasone) nasal spray  as directed for sinus congestion and postnasal drip. This is a steroid nasal spray that works locally over time to decrease the inflammation in your nose/sinuses and help with allergic symptoms. This is not an quick- relief spray like afrin, but it works well if used daily.  Discontinue if you develop nose bleed  - use nasal saline prior to Flonase.    - Use Ocean Spray Nasal Saline 1-3 puffs each nostril every 2-3 hours then blow out onto tissue. This is to irrigate the nasal passage way to clear the sinus openings. Use until sinus problem resolved.    - you can take plain Mucinex (guaifenesin) 1200 mg twice a day to help loosen mucous    -warm salt water gargles can help with sore throat    - warm tea with honey can help with cough. Honey is a natural cough suppressant.    - Follow up with your PCP or specialty clinic as directed in the next 1-2 weeks if not improved or as needed.  You can call [phone] to schedule an appointment with the appropriate provider.      - Go to the ER if you develop new or worsening symptoms.     - You must understand that you have received an Urgent Care treatment only and that you may be released before all of your medical problems are known or treated.   - You, the patient, will arrange for follow up care as instructed.   - If your condition worsens or fails to improve we recommend that you receive another evaluation at the ER immediately or contact your PCP to discuss your concerns or return here.           Earache, No Infection (Adult)  Earaches can happen without an infection. This occurs when air and fluid build up behind the eardrum causing a feeling of fullness and discomfort and reduced hearing. This is called otitis media with effusion (OME) or serous otitis media. It means there is fluid in the middle ear. It is not the same as acute otitis media, which is typically from infection.  OME can happen when you have a cold if congestion blocks the passage that drains the  middle ear. This passage is called the eustachian tube. OME may also occur with nasal allergies or after a bacterial middle ear infection.    The pain or discomfort may come and go. You may hear clicking or popping sounds when you chew or swallow. You may feel that your balance is off. Or you may hear ringing in the ear.  It often takes from several weeks up to 3 months for the fluid to clear on its own. Oral pain relievers and ear drops help if there is pain. Decongestants and antihistamines sometimes help. Antibiotics don't help since there is no infection. Your doctor may prescribe a nasal spray to help reduce swelling in the nose and eustachian tube. This can allow the ear to drain.  If your OME doesn't improve after 3 months, surgery may be used to drain the fluid and insert a small tube in the eardrum to allow continued drainage.  Because the middle ear fluid can become infected, it is important to watch for signs of an ear infection which may develop later. These signs include increased ear pain, fever, or drainage from the ear.  Home care  The following guidelines will help you care for yourself at home:  · You may use over-the-counter medicine as directed to control pain, unless another medicine was prescribed. If you have chronic liver or kidney disease or ever had a stomach ulcer or GI bleeding, talk with your doctor before using these medicines. Aspirin should never be used in anyone under 18 years of age who is ill with a fever. It may cause severe liver damage.  · You may use over-the-counter decongestants such as phenylephrine or pseudoephedrine. But they are not always helpful. Don't use nasal spray decongestants more than 3 days. Longer use can make congestion worse. Prescription nasal sprays from your doctor don't typically have those restrictions.  · Antihistamines may help if you are also having allergy symptoms.  · You may use medicines such as guaifenesin to thin mucus and promote  "drainage.  Follow-up care  Follow up with your healthcare provider or as advised if you are not feeling better after 3 days.  When to seek medical advice  Call your healthcare provider right away if any of the following occur:  · Your ear pain gets worse or does not start to improve   · Fever of 100.4°F (38°C) or higher, or as directed by your healthcare provider  · Fluid or blood draining from the ear  · Headache or sinus pain  · Stiff neck  · Unusual drowsiness or confusion  Date Last Reviewed: 10/1/2016  © 4326-8920 [employer]. [address] [place] [id number]. All rights reserved. This information is not intended as a substitute for professional medical care. Always follow your healthcare professional's instructions.        Viral Syndrome (Adult)  A viral illness may cause a number of symptoms. The symptoms depend on the part of the body that the virus affects. If it settles in your nose, throat, and lungs, it may cause cough, sore throat, congestion, and sometimes headache. If it settles in your stomach and intestinal tract, it may cause vomiting and diarrhea. Sometimes it causes vague symptoms like "aching all over," feeling tired, loss of appetite, or fever.  A viral illness usually lasts 1 to 2 weeks, but sometimes it lasts longer. In some cases, a more serious infection can look like a viral syndrome in the first few days of the illness. You may need another exam and additional tests to know the difference. Watch for the warning signs listed below.  Home care  Follow these guidelines for taking care of yourself at home:  · If symptoms are severe, rest at home for the first 2 to 3 days.  · Stay away from cigarette smoke - both your smoke and the smoke from others.  · You may use over-the-counter acetaminophen or ibuprofen for fever, muscle aching, and headache, unless another medicine was prescribed for this. If you have chronic liver or kidney disease or ever had a stomach ulcer or " GI bleeding, talk with your doctor before using these medicines. No one who is younger than 18 and ill with a fever should take aspirin. It may cause severe disease or death.  · Your appetite may be poor, so a light diet is fine. Avoid dehydration by drinking 8 to 12 8-ounce glasses of fluids each day. This may include water; orange juice; lemonade; apple, grape, and cranberry juice; clear fruit drinks; electrolyte replacement and sports drinks; and decaffeinated teas and coffee. If you have been diagnosed with a kidney disease, ask your doctor how much and what types of fluids you should drink to prevent dehydration. If you have kidney disease, drinking too much fluid can cause it build up in the your body and be dangerous to your health.  · Over-the-counter remedies won't shorten the length of the illness but may be helpful for cough, sore throat; and nasal and sinus congestion. Don't use decongestants if you have high blood pressure.  Follow-up care  Follow up with your healthcare provider if you do not improve over the next week.  Call 911  Get emergency medical care if any of the following occur:  · Convulsion  · Feeling weak, dizzy, or like you are going to faint  · Chest pain, shortness of breath, wheezing, or difficulty breathing  When to seek medical advice  Call your healthcare provider right away if any of these occur:  · Cough with lots of colored sputum (mucus) or blood in your sputum  · Chest pain, shortness of breath, wheezing, or difficulty breathing  · Severe headache; face, neck, or ear pain  · Severe, constant pain in the lower right side of your belly (abdominal)  · Continued vomiting (cant keep liquids down)  · Frequent diarrhea (more than 5 times a day); blood (red or black color) or mucus in diarrhea  · Feeling weak, dizzy, or like you are going to faint  · Extreme thirst  · Fever of 100.4°F (38°C) or higher, or as directed by your healthcare provider  Date Last Reviewed: 9/25/2015  ©  3154-4633 [employer]. [address] [place] [address], [place] [address]. All rights reserved. This information is not intended as a substitute for professional medical care. Always follow your healthcare professional's instructions.

## 2020-02-22 NOTE — LETTER
February 22, 2020      [facility] [address] [place]  [facility] [address]  Phone: [phone]  Fax: [phone]       Patient: Jewels Byrnes   YOB: 1975  Date of Visit: 02/22/2020    To Whom It May Concern:    Darling Byrnes  was at [facility] on 02/22/2020. She may return to work/school on 2/27/2020 (or sooner if symptoms improve and she goes 24 hours without fever without fever reducing medication) with no restrictions. If you have any questions or concerns, or if I can be of further assistance, please do not hesitate to contact me.    Sincerely,    Nikolas Chowdhury PA-C

## 2020-02-23 NOTE — PATIENT INSTRUCTIONS
- Rest.    - Drink plenty of fluids.      - Viral upper respiratory infections typically run their course in 10-14 days.     - Tylenol or Ibuprofen as directed as needed for fever/pain. Avoid tylenol if you have a history of liver disease. Do not take ibuprofen if you have a history of GI bleeding, kidney disease, or if you take blood thinners.   - do not take ibuprofen until 8 hr from now because you got a shot in clinic today    - You can take over-the-counter claritin, zyrtec, allegra, or xyzal as directed. These are antihistamines that can help with runny nose, nasal congestion, sneezing, and helps to dry up post-nasal drip, which usually causes sore throat and cough.   - If you do NOT have high blood pressure, you may use a decongestant form (D)  of this medication or if you do not take the D form, you can take sudafed  (pseudoephedrine) over the counter, which is a decongestant.    - You can use Flonase (fluticasone) nasal spray as directed for sinus congestion and postnasal drip. This is a steroid nasal spray that works locally over time to decrease the inflammation in your nose/sinuses and help with allergic symptoms. This is not an quick- relief spray like afrin, but it works well if used daily.  Discontinue if you develop nose bleed  - use nasal saline prior to Flonase.    - Use Ocean Spray Nasal Saline 1-3 puffs each nostril every 2-3 hours then blow out onto tissue. This is to irrigate the nasal passage way to clear the sinus openings. Use until sinus problem resolved.    - you can take plain Mucinex (guaifenesin) 1200 mg twice a day to help loosen mucous    -warm salt water gargles can help with sore throat    - warm tea with honey can help with cough. Honey is a natural cough suppressant.    - Follow up with your PCP or specialty clinic as directed in the next 1-2 weeks if not improved or as needed.  You can call [phone] to schedule an appointment with the appropriate provider.      - Go to the  ER if you develop new or worsening symptoms.     - You must understand that you have received an Urgent Care treatment only and that you may be released before all of your medical problems are known or treated.   - You, the patient, will arrange for follow up care as instructed.   - If your condition worsens or fails to improve we recommend that you receive another evaluation at the ER immediately or contact your PCP to discuss your concerns or return here.           Earache, No Infection (Adult)  Earaches can happen without an infection. This occurs when air and fluid build up behind the eardrum causing a feeling of fullness and discomfort and reduced hearing. This is called otitis media with effusion (OME) or serous otitis media. It means there is fluid in the middle ear. It is not the same as acute otitis media, which is typically from infection.  OME can happen when you have a cold if congestion blocks the passage that drains the middle ear. This passage is called the eustachian tube. OME may also occur with nasal allergies or after a bacterial middle ear infection.    The pain or discomfort may come and go. You may hear clicking or popping sounds when you chew or swallow. You may feel that your balance is off. Or you may hear ringing in the ear.  It often takes from several weeks up to 3 months for the fluid to clear on its own. Oral pain relievers and ear drops help if there is pain. Decongestants and antihistamines sometimes help. Antibiotics don't help since there is no infection. Your doctor may prescribe a nasal spray to help reduce swelling in the nose and eustachian tube. This can allow the ear to drain.  If your OME doesn't improve after 3 months, surgery may be used to drain the fluid and insert a small tube in the eardrum to allow continued drainage.  Because the middle ear fluid can become infected, it is important to watch for signs of an ear infection which may develop later. These signs include  increased ear pain, fever, or drainage from the ear.  Home care  The following guidelines will help you care for yourself at home:  · You may use over-the-counter medicine as directed to control pain, unless another medicine was prescribed. If you have chronic liver or kidney disease or ever had a stomach ulcer or GI bleeding, talk with your doctor before using these medicines. Aspirin should never be used in anyone under 18 years of age who is ill with a fever. It may cause severe liver damage.  · You may use over-the-counter decongestants such as phenylephrine or pseudoephedrine. But they are not always helpful. Don't use nasal spray decongestants more than 3 days. Longer use can make congestion worse. Prescription nasal sprays from your doctor don't typically have those restrictions.  · Antihistamines may help if you are also having allergy symptoms.  · You may use medicines such as guaifenesin to thin mucus and promote drainage.  Follow-up care  Follow up with your healthcare provider or as advised if you are not feeling better after 3 days.  When to seek medical advice  Call your healthcare provider right away if any of the following occur:  · Your ear pain gets worse or does not start to improve   · Fever of 100.4°F (38°C) or higher, or as directed by your healthcare provider  · Fluid or blood draining from the ear  · Headache or sinus pain  · Stiff neck  · Unusual drowsiness or confusion  Date Last Reviewed: 10/1/2016  © 0629-0595 [employer]. [address] [place] [address], [place] [address]. All rights reserved. This information is not intended as a substitute for professional medical care. Always follow your healthcare professional's instructions.        Viral Syndrome (Adult)  A viral illness may cause a number of symptoms. The symptoms depend on the part of the body that the virus affects. If it settles in your nose, throat, and lungs, it may cause cough, sore throat, congestion, and sometimes  "headache. If it settles in your stomach and intestinal tract, it may cause vomiting and diarrhea. Sometimes it causes vague symptoms like "aching all over," feeling tired, loss of appetite, or fever.  A viral illness usually lasts 1 to 2 weeks, but sometimes it lasts longer. In some cases, a more serious infection can look like a viral syndrome in the first few days of the illness. You may need another exam and additional tests to know the difference. Watch for the warning signs listed below.  Home care  Follow these guidelines for taking care of yourself at home:  · If symptoms are severe, rest at home for the first 2 to 3 days.  · Stay away from cigarette smoke - both your smoke and the smoke from others.  · You may use over-the-counter acetaminophen or ibuprofen for fever, muscle aching, and headache, unless another medicine was prescribed for this. If you have chronic liver or kidney disease or ever had a stomach ulcer or GI bleeding, talk with your doctor before using these medicines. No one who is younger than 18 and ill with a fever should take aspirin. It may cause severe disease or death.  · Your appetite may be poor, so a light diet is fine. Avoid dehydration by drinking 8 to 12 8-ounce glasses of fluids each day. This may include water; orange juice; lemonade; apple, grape, and cranberry juice; clear fruit drinks; electrolyte replacement and sports drinks; and decaffeinated teas and coffee. If you have been diagnosed with a kidney disease, ask your doctor how much and what types of fluids you should drink to prevent dehydration. If you have kidney disease, drinking too much fluid can cause it build up in the your body and be dangerous to your health.  · Over-the-counter remedies won't shorten the length of the illness but may be helpful for cough, sore throat; and nasal and sinus congestion. Don't use decongestants if you have high blood pressure.  Follow-up care  Follow up with your healthcare provider " if you do not improve over the next week.  Call 911  Get emergency medical care if any of the following occur:  · Convulsion  · Feeling weak, dizzy, or like you are going to faint  · Chest pain, shortness of breath, wheezing, or difficulty breathing  When to seek medical advice  Call your healthcare provider right away if any of these occur:  · Cough with lots of colored sputum (mucus) or blood in your sputum  · Chest pain, shortness of breath, wheezing, or difficulty breathing  · Severe headache; face, neck, or ear pain  · Severe, constant pain in the lower right side of your belly (abdominal)  · Continued vomiting (cant keep liquids down)  · Frequent diarrhea (more than 5 times a day); blood (red or black color) or mucus in diarrhea  · Feeling weak, dizzy, or like you are going to faint  · Extreme thirst  · Fever of 100.4°F (38°C) or higher, or as directed by your healthcare provider  Date Last Reviewed: 9/25/2015  © 1342-5633 [employer]. [address] [place] [address], [place] [address]. All rights reserved. This information is not intended as a substitute for professional medical care. Always follow your healthcare professional's instructions.

## 2020-12-01 ENCOUNTER — LAB VISIT (OUTPATIENT)
Dept: LAB | Facility: HOSPITAL | Age: 45
End: 2020-12-01
Attending: INTERNAL MEDICINE
Payer: MEDICARE

## 2020-12-01 DIAGNOSIS — B95.62 CELLULITIS DUE TO MRSA: ICD-10-CM

## 2020-12-01 DIAGNOSIS — A41.02 METHICILLIN RESISTANT STAPHYLOCOCCUS AUREUS SEPTICEMIA: Primary | ICD-10-CM

## 2020-12-01 DIAGNOSIS — L03.90 CELLULITIS DUE TO MRSA: ICD-10-CM

## 2020-12-01 LAB
ALBUMIN SERPL BCP-MCNC: 3.1 G/DL (ref 3.5–5.2)
ALP SERPL-CCNC: 135 U/L (ref 55–135)
ALT SERPL W/O P-5'-P-CCNC: 44 U/L (ref 10–44)
ANION GAP SERPL CALC-SCNC: 9 MMOL/L (ref 8–16)
AST SERPL-CCNC: 13 U/L (ref 10–40)
BASOPHILS # BLD AUTO: 0 K/UL (ref 0–0.2)
BASOPHILS NFR BLD: 0 % (ref 0–1.9)
BILIRUB SERPL-MCNC: 0.4 MG/DL (ref 0.1–1)
BUN SERPL-MCNC: 16 MG/DL (ref 6–20)
CALCIUM SERPL-MCNC: 7.9 MG/DL (ref 8.7–10.5)
CHLORIDE SERPL-SCNC: 111 MMOL/L (ref 95–110)
CO2 SERPL-SCNC: 20 MMOL/L (ref 23–29)
CREAT SERPL-MCNC: 0.5 MG/DL (ref 0.5–1.4)
DIFFERENTIAL METHOD: ABNORMAL
EOSINOPHIL # BLD AUTO: 0 K/UL (ref 0–0.5)
EOSINOPHIL NFR BLD: 0 % (ref 0–8)
ERYTHROCYTE [DISTWIDTH] IN BLOOD BY AUTOMATED COUNT: 18.5 % (ref 11.5–14.5)
EST. GFR  (AFRICAN AMERICAN): >60 ML/MIN/1.73 M^2
EST. GFR  (NON AFRICAN AMERICAN): >60 ML/MIN/1.73 M^2
GLUCOSE SERPL-MCNC: 116 MG/DL (ref 70–110)
HCT VFR BLD AUTO: 34.3 % (ref 37–48.5)
HGB BLD-MCNC: 10.4 G/DL (ref 12–16)
IMM GRANULOCYTES # BLD AUTO: 0.03 K/UL (ref 0–0.04)
IMM GRANULOCYTES NFR BLD AUTO: 0.6 % (ref 0–0.5)
LYMPHOCYTES # BLD AUTO: 0.8 K/UL (ref 1–4.8)
LYMPHOCYTES NFR BLD: 16.7 % (ref 18–48)
MCH RBC QN AUTO: 28.3 PG (ref 27–31)
MCHC RBC AUTO-ENTMCNC: 30.3 G/DL (ref 32–36)
MCV RBC AUTO: 94 FL (ref 82–98)
MONOCYTES # BLD AUTO: 0.5 K/UL (ref 0.3–1)
MONOCYTES NFR BLD: 9.1 % (ref 4–15)
NEUTROPHILS # BLD AUTO: 3.7 K/UL (ref 1.8–7.7)
NEUTROPHILS NFR BLD: 73.6 % (ref 38–73)
NRBC BLD-RTO: 0 /100 WBC
PLATELET # BLD AUTO: 319 K/UL (ref 150–350)
PMV BLD AUTO: 13 FL (ref 9.2–12.9)
POTASSIUM SERPL-SCNC: 3.7 MMOL/L (ref 3.5–5.1)
PROT SERPL-MCNC: 5.1 G/DL (ref 6–8.4)
RBC # BLD AUTO: 3.67 M/UL (ref 4–5.4)
SODIUM SERPL-SCNC: 140 MMOL/L (ref 136–145)
VANCOMYCIN SERPL-MCNC: <1.1 UG/ML
WBC # BLD AUTO: 4.97 K/UL (ref 3.9–12.7)

## 2020-12-01 PROCEDURE — 86140 C-REACTIVE PROTEIN: CPT

## 2020-12-01 PROCEDURE — 80053 COMPREHEN METABOLIC PANEL: CPT

## 2020-12-01 PROCEDURE — 85025 COMPLETE CBC W/AUTO DIFF WBC: CPT

## 2020-12-01 PROCEDURE — 80202 ASSAY OF VANCOMYCIN: CPT

## 2020-12-02 ENCOUNTER — LAB VISIT (OUTPATIENT)
Dept: LAB | Facility: HOSPITAL | Age: 45
End: 2020-12-02
Attending: INTERNAL MEDICINE
Payer: MEDICARE

## 2020-12-02 DIAGNOSIS — L03.90 CELLULITIS DUE TO MRSA: ICD-10-CM

## 2020-12-02 DIAGNOSIS — A41.02 METHICILLIN RESISTANT STAPHYLOCOCCUS AUREUS SEPTICEMIA: Primary | ICD-10-CM

## 2020-12-02 DIAGNOSIS — B95.62 CELLULITIS DUE TO MRSA: ICD-10-CM

## 2020-12-02 LAB
ALBUMIN SERPL BCP-MCNC: 3 G/DL (ref 3.5–5.2)
ALP SERPL-CCNC: 129 U/L (ref 55–135)
ALT SERPL W/O P-5'-P-CCNC: 39 U/L (ref 10–44)
ANION GAP SERPL CALC-SCNC: 8 MMOL/L (ref 8–16)
AST SERPL-CCNC: 15 U/L (ref 10–40)
BASOPHILS # BLD AUTO: 0.01 K/UL (ref 0–0.2)
BASOPHILS NFR BLD: 0.1 % (ref 0–1.9)
BILIRUB SERPL-MCNC: 0.3 MG/DL (ref 0.1–1)
BUN SERPL-MCNC: 13 MG/DL (ref 6–20)
CALCIUM SERPL-MCNC: 7.7 MG/DL (ref 8.7–10.5)
CHLORIDE SERPL-SCNC: 112 MMOL/L (ref 95–110)
CO2 SERPL-SCNC: 18 MMOL/L (ref 23–29)
CREAT SERPL-MCNC: 0.5 MG/DL (ref 0.5–1.4)
CRP SERPL-MCNC: 1.6 MG/L (ref 0–8.2)
CRP SERPL-MCNC: 4.2 MG/L (ref 0–8.2)
DIFFERENTIAL METHOD: ABNORMAL
EOSINOPHIL # BLD AUTO: 0 K/UL (ref 0–0.5)
EOSINOPHIL NFR BLD: 0 % (ref 0–8)
ERYTHROCYTE [DISTWIDTH] IN BLOOD BY AUTOMATED COUNT: 18.6 % (ref 11.5–14.5)
ERYTHROCYTE [SEDIMENTATION RATE] IN BLOOD BY WESTERGREN METHOD: 2 MM/HR (ref 0–20)
EST. GFR  (AFRICAN AMERICAN): >60 ML/MIN/1.73 M^2
EST. GFR  (NON AFRICAN AMERICAN): >60 ML/MIN/1.73 M^2
GLUCOSE SERPL-MCNC: 80 MG/DL (ref 70–110)
HCT VFR BLD AUTO: 33.1 % (ref 37–48.5)
HGB BLD-MCNC: 9.9 G/DL (ref 12–16)
IMM GRANULOCYTES # BLD AUTO: 0.05 K/UL (ref 0–0.04)
IMM GRANULOCYTES NFR BLD AUTO: 0.6 % (ref 0–0.5)
LYMPHOCYTES # BLD AUTO: 0.6 K/UL (ref 1–4.8)
LYMPHOCYTES NFR BLD: 7.2 % (ref 18–48)
MCH RBC QN AUTO: 28.3 PG (ref 27–31)
MCHC RBC AUTO-ENTMCNC: 29.9 G/DL (ref 32–36)
MCV RBC AUTO: 95 FL (ref 82–98)
MONOCYTES # BLD AUTO: 0.9 K/UL (ref 0.3–1)
MONOCYTES NFR BLD: 10.8 % (ref 4–15)
NEUTROPHILS # BLD AUTO: 6.8 K/UL (ref 1.8–7.7)
NEUTROPHILS NFR BLD: 81.3 % (ref 38–73)
NRBC BLD-RTO: 0 /100 WBC
PLATELET # BLD AUTO: 298 K/UL (ref 150–350)
PMV BLD AUTO: 12.6 FL (ref 9.2–12.9)
POTASSIUM SERPL-SCNC: 4.7 MMOL/L (ref 3.5–5.1)
PROT SERPL-MCNC: 4.9 G/DL (ref 6–8.4)
RBC # BLD AUTO: 3.5 M/UL (ref 4–5.4)
SODIUM SERPL-SCNC: 138 MMOL/L (ref 136–145)
WBC # BLD AUTO: 8.34 K/UL (ref 3.9–12.7)

## 2020-12-02 PROCEDURE — 80053 COMPREHEN METABOLIC PANEL: CPT

## 2020-12-02 PROCEDURE — 85025 COMPLETE CBC W/AUTO DIFF WBC: CPT

## 2020-12-02 PROCEDURE — 86140 C-REACTIVE PROTEIN: CPT

## 2020-12-02 PROCEDURE — 85651 RBC SED RATE NONAUTOMATED: CPT | Mod: PO

## 2020-12-14 ENCOUNTER — LAB VISIT (OUTPATIENT)
Dept: LAB | Facility: HOSPITAL | Age: 45
End: 2020-12-14
Payer: MEDICARE

## 2020-12-14 DIAGNOSIS — L02.411 ABSCESS OF RIGHT AXILLA: ICD-10-CM

## 2020-12-14 DIAGNOSIS — A41.02 METHICILLIN RESISTANT STAPHYLOCOCCUS AUREUS SEPTICEMIA: Primary | ICD-10-CM

## 2020-12-14 LAB
ERYTHROCYTE [DISTWIDTH] IN BLOOD BY AUTOMATED COUNT: 18.8 % (ref 11.5–14.5)
ERYTHROCYTE [SEDIMENTATION RATE] IN BLOOD BY WESTERGREN METHOD: <2 MM/HR (ref 0–36)
HCT VFR BLD AUTO: 31.2 % (ref 37–48.5)
HGB BLD-MCNC: 9.9 G/DL (ref 12–16)
MCH RBC QN AUTO: 28.6 PG (ref 27–31)
MCHC RBC AUTO-ENTMCNC: 31.7 G/DL (ref 32–36)
MCV RBC AUTO: 90 FL (ref 82–98)
PLATELET # BLD AUTO: 210 K/UL (ref 150–350)
PMV BLD AUTO: 11.3 FL (ref 9.2–12.9)
RBC # BLD AUTO: 3.46 M/UL (ref 4–5.4)
WBC # BLD AUTO: 13.77 K/UL (ref 3.9–12.7)

## 2020-12-14 PROCEDURE — 86140 C-REACTIVE PROTEIN: CPT

## 2020-12-14 PROCEDURE — 83735 ASSAY OF MAGNESIUM: CPT

## 2020-12-14 PROCEDURE — 80053 COMPREHEN METABOLIC PANEL: CPT

## 2020-12-14 PROCEDURE — 84134 ASSAY OF PREALBUMIN: CPT

## 2020-12-14 PROCEDURE — 85027 COMPLETE CBC AUTOMATED: CPT

## 2020-12-14 PROCEDURE — 85652 RBC SED RATE AUTOMATED: CPT

## 2020-12-15 LAB
ALBUMIN SERPL BCP-MCNC: 3.1 G/DL (ref 3.5–5.2)
ALP SERPL-CCNC: 109 U/L (ref 55–135)
ALT SERPL W/O P-5'-P-CCNC: 25 U/L (ref 10–44)
ANION GAP SERPL CALC-SCNC: 7 MMOL/L (ref 8–16)
AST SERPL-CCNC: 18 U/L (ref 10–40)
BILIRUB SERPL-MCNC: 0.4 MG/DL (ref 0.1–1)
BUN SERPL-MCNC: 16 MG/DL (ref 6–20)
CALCIUM SERPL-MCNC: 8.2 MG/DL (ref 8.7–10.5)
CHLORIDE SERPL-SCNC: 109 MMOL/L (ref 95–110)
CO2 SERPL-SCNC: 22 MMOL/L (ref 23–29)
CREAT SERPL-MCNC: 0.5 MG/DL (ref 0.5–1.4)
CRP SERPL-MCNC: 8.8 MG/L (ref 0–8.2)
EST. GFR  (AFRICAN AMERICAN): >60 ML/MIN/1.73 M^2
EST. GFR  (NON AFRICAN AMERICAN): >60 ML/MIN/1.73 M^2
GLUCOSE SERPL-MCNC: 92 MG/DL (ref 70–110)
MAGNESIUM SERPL-MCNC: 2.2 MG/DL (ref 1.6–2.6)
POTASSIUM SERPL-SCNC: 4.5 MMOL/L (ref 3.5–5.1)
PREALB SERPL-MCNC: 10 MG/DL (ref 20–43)
PROT SERPL-MCNC: 5 G/DL (ref 6–8.4)
SODIUM SERPL-SCNC: 138 MMOL/L (ref 136–145)

## 2020-12-24 ENCOUNTER — LAB VISIT (OUTPATIENT)
Dept: LAB | Facility: HOSPITAL | Age: 45
End: 2020-12-24
Attending: INTERNAL MEDICINE
Payer: MEDICARE

## 2020-12-24 DIAGNOSIS — A41.02 METHICILLIN RESISTANT STAPHYLOCOCCUS AUREUS SEPTICEMIA: Primary | ICD-10-CM

## 2020-12-24 LAB
ALBUMIN SERPL BCP-MCNC: 3.3 G/DL (ref 3.5–5.2)
ALP SERPL-CCNC: 127 U/L (ref 55–135)
ALT SERPL W/O P-5'-P-CCNC: 24 U/L (ref 10–44)
ANION GAP SERPL CALC-SCNC: 7 MMOL/L (ref 8–16)
AST SERPL-CCNC: 21 U/L (ref 10–40)
BASOPHILS # BLD AUTO: 0.01 K/UL (ref 0–0.2)
BASOPHILS NFR BLD: 0.1 % (ref 0–1.9)
BILIRUB SERPL-MCNC: 0.4 MG/DL (ref 0.1–1)
BUN SERPL-MCNC: 15 MG/DL (ref 6–20)
CALCIUM SERPL-MCNC: 8.2 MG/DL (ref 8.7–10.5)
CHLORIDE SERPL-SCNC: 108 MMOL/L (ref 95–110)
CO2 SERPL-SCNC: 19 MMOL/L (ref 23–29)
CREAT SERPL-MCNC: 0.5 MG/DL (ref 0.5–1.4)
CRP SERPL-MCNC: 1.5 MG/L (ref 0–8.2)
DIFFERENTIAL METHOD: ABNORMAL
EOSINOPHIL # BLD AUTO: 0 K/UL (ref 0–0.5)
EOSINOPHIL NFR BLD: 0 % (ref 0–8)
ERYTHROCYTE [DISTWIDTH] IN BLOOD BY AUTOMATED COUNT: 17.7 % (ref 11.5–14.5)
ERYTHROCYTE [SEDIMENTATION RATE] IN BLOOD BY WESTERGREN METHOD: <2 MM/HR (ref 0–36)
EST. GFR  (AFRICAN AMERICAN): >60 ML/MIN/1.73 M^2
EST. GFR  (NON AFRICAN AMERICAN): >60 ML/MIN/1.73 M^2
GLUCOSE SERPL-MCNC: 82 MG/DL (ref 70–110)
HCT VFR BLD AUTO: 35.4 % (ref 37–48.5)
HGB BLD-MCNC: 10.6 G/DL (ref 12–16)
IMM GRANULOCYTES # BLD AUTO: 0.12 K/UL (ref 0–0.04)
IMM GRANULOCYTES NFR BLD AUTO: 0.9 % (ref 0–0.5)
LYMPHOCYTES # BLD AUTO: 0.9 K/UL (ref 1–4.8)
LYMPHOCYTES NFR BLD: 6.9 % (ref 18–48)
MAGNESIUM SERPL-MCNC: 1.9 MG/DL (ref 1.6–2.6)
MCH RBC QN AUTO: 27.9 PG (ref 27–31)
MCHC RBC AUTO-ENTMCNC: 29.9 G/DL (ref 32–36)
MCV RBC AUTO: 93 FL (ref 82–98)
MONOCYTES # BLD AUTO: 1.1 K/UL (ref 0.3–1)
MONOCYTES NFR BLD: 8.3 % (ref 4–15)
NEUTROPHILS # BLD AUTO: 11.5 K/UL (ref 1.8–7.7)
NEUTROPHILS NFR BLD: 83.8 % (ref 38–73)
NRBC BLD-RTO: 0 /100 WBC
PLATELET # BLD AUTO: 271 K/UL (ref 150–350)
PMV BLD AUTO: 11.6 FL (ref 9.2–12.9)
POTASSIUM SERPL-SCNC: 4.6 MMOL/L (ref 3.5–5.1)
PROT SERPL-MCNC: 5.1 G/DL (ref 6–8.4)
RBC # BLD AUTO: 3.8 M/UL (ref 4–5.4)
SODIUM SERPL-SCNC: 134 MMOL/L (ref 136–145)
WBC # BLD AUTO: 13.7 K/UL (ref 3.9–12.7)

## 2020-12-24 PROCEDURE — 85025 COMPLETE CBC W/AUTO DIFF WBC: CPT

## 2020-12-24 PROCEDURE — 85652 RBC SED RATE AUTOMATED: CPT

## 2020-12-24 PROCEDURE — 83735 ASSAY OF MAGNESIUM: CPT

## 2020-12-24 PROCEDURE — 80053 COMPREHEN METABOLIC PANEL: CPT

## 2020-12-24 PROCEDURE — 86140 C-REACTIVE PROTEIN: CPT

## 2020-12-24 PROCEDURE — 84134 ASSAY OF PREALBUMIN: CPT

## 2020-12-28 LAB — PREALB SERPL-MCNC: 14 MG/DL (ref 20–43)

## 2020-12-31 ENCOUNTER — LAB VISIT (OUTPATIENT)
Dept: LAB | Facility: HOSPITAL | Age: 45
End: 2020-12-31
Attending: INTERNAL MEDICINE
Payer: MEDICARE

## 2020-12-31 DIAGNOSIS — A41.02 METHICILLIN RESISTANT STAPHYLOCOCCUS AUREUS SEPTICEMIA: Primary | ICD-10-CM

## 2020-12-31 DIAGNOSIS — L02.411 ABSCESS OF RIGHT AXILLA: ICD-10-CM

## 2020-12-31 LAB
ALBUMIN SERPL BCP-MCNC: 3.5 G/DL (ref 3.5–5.2)
ALP SERPL-CCNC: 125 U/L (ref 55–135)
ALT SERPL W/O P-5'-P-CCNC: 39 U/L (ref 10–44)
ANION GAP SERPL CALC-SCNC: 8 MMOL/L (ref 8–16)
AST SERPL-CCNC: 34 U/L (ref 10–40)
BASOPHILS # BLD AUTO: 0.04 K/UL (ref 0–0.2)
BASOPHILS NFR BLD: 0.3 % (ref 0–1.9)
BILIRUB SERPL-MCNC: 0.5 MG/DL (ref 0.1–1)
BUN SERPL-MCNC: 19 MG/DL (ref 6–20)
CALCIUM SERPL-MCNC: 8.2 MG/DL (ref 8.7–10.5)
CHLORIDE SERPL-SCNC: 105 MMOL/L (ref 95–110)
CO2 SERPL-SCNC: 22 MMOL/L (ref 23–29)
CREAT SERPL-MCNC: 0.5 MG/DL (ref 0.5–1.4)
CRP SERPL-MCNC: 1.3 MG/L (ref 0–8.2)
DIFFERENTIAL METHOD: ABNORMAL
EOSINOPHIL # BLD AUTO: 0 K/UL (ref 0–0.5)
EOSINOPHIL NFR BLD: 0 % (ref 0–8)
ERYTHROCYTE [DISTWIDTH] IN BLOOD BY AUTOMATED COUNT: 17.5 % (ref 11.5–14.5)
ERYTHROCYTE [SEDIMENTATION RATE] IN BLOOD BY WESTERGREN METHOD: <2 MM/HR (ref 0–36)
EST. GFR  (AFRICAN AMERICAN): >60 ML/MIN/1.73 M^2
EST. GFR  (NON AFRICAN AMERICAN): >60 ML/MIN/1.73 M^2
GLUCOSE SERPL-MCNC: 71 MG/DL (ref 70–110)
HCT VFR BLD AUTO: 37.4 % (ref 37–48.5)
HGB BLD-MCNC: 11.3 G/DL (ref 12–16)
IMM GRANULOCYTES # BLD AUTO: 0.14 K/UL (ref 0–0.04)
IMM GRANULOCYTES NFR BLD AUTO: 1 % (ref 0–0.5)
LYMPHOCYTES # BLD AUTO: 0.6 K/UL (ref 1–4.8)
LYMPHOCYTES NFR BLD: 4.2 % (ref 18–48)
MAGNESIUM SERPL-MCNC: 1.8 MG/DL (ref 1.6–2.6)
MCH RBC QN AUTO: 28.3 PG (ref 27–31)
MCHC RBC AUTO-ENTMCNC: 30.2 G/DL (ref 32–36)
MCV RBC AUTO: 94 FL (ref 82–98)
MONOCYTES # BLD AUTO: 0.8 K/UL (ref 0.3–1)
MONOCYTES NFR BLD: 5.6 % (ref 4–15)
NEUTROPHILS # BLD AUTO: 12.2 K/UL (ref 1.8–7.7)
NEUTROPHILS NFR BLD: 88.9 % (ref 38–73)
NRBC BLD-RTO: 0 /100 WBC
PLATELET # BLD AUTO: 262 K/UL (ref 150–350)
PMV BLD AUTO: 12 FL (ref 9.2–12.9)
POTASSIUM SERPL-SCNC: 3.9 MMOL/L (ref 3.5–5.1)
PROT SERPL-MCNC: 5.5 G/DL (ref 6–8.4)
RBC # BLD AUTO: 4 M/UL (ref 4–5.4)
SODIUM SERPL-SCNC: 135 MMOL/L (ref 136–145)
WBC # BLD AUTO: 13.68 K/UL (ref 3.9–12.7)

## 2020-12-31 PROCEDURE — 80053 COMPREHEN METABOLIC PANEL: CPT

## 2020-12-31 PROCEDURE — 86140 C-REACTIVE PROTEIN: CPT

## 2020-12-31 PROCEDURE — 83735 ASSAY OF MAGNESIUM: CPT

## 2020-12-31 PROCEDURE — 85652 RBC SED RATE AUTOMATED: CPT

## 2020-12-31 PROCEDURE — 85025 COMPLETE CBC W/AUTO DIFF WBC: CPT

## 2020-12-31 PROCEDURE — 84134 ASSAY OF PREALBUMIN: CPT

## 2021-01-01 ENCOUNTER — LAB VISIT (OUTPATIENT)
Dept: LAB | Facility: HOSPITAL | Age: 46
End: 2021-01-01
Attending: INTERNAL MEDICINE
Payer: MEDICARE

## 2021-01-01 ENCOUNTER — DOCUMENT SCAN (OUTPATIENT)
Dept: HOME HEALTH SERVICES | Facility: HOSPITAL | Age: 46
End: 2021-01-01

## 2021-01-01 ENCOUNTER — PATIENT MESSAGE (OUTPATIENT)
Dept: RESEARCH | Facility: HOSPITAL | Age: 46
End: 2021-01-01

## 2021-01-01 ENCOUNTER — DOCUMENT SCAN (OUTPATIENT)
Dept: HOME HEALTH SERVICES | Facility: HOSPITAL | Age: 46
End: 2021-01-01
Payer: MEDICARE

## 2021-01-01 ENCOUNTER — EXTERNAL HOME HEALTH (OUTPATIENT)
Dept: HOME HEALTH SERVICES | Facility: HOSPITAL | Age: 46
End: 2021-01-01

## 2021-01-01 DIAGNOSIS — E86.0 DEHYDRATION: ICD-10-CM

## 2021-01-01 DIAGNOSIS — A41.02 METHICILLIN RESISTANT STAPHYLOCOCCUS AUREUS SEPTICEMIA: Primary | ICD-10-CM

## 2021-01-01 DIAGNOSIS — E86.0 DEHYDRATION: Primary | ICD-10-CM

## 2021-01-01 DIAGNOSIS — K86.1 CHRONIC PANCREATITIS: ICD-10-CM

## 2021-01-01 DIAGNOSIS — E27.49 CORTICOSTERONE 18-MONOOXYGENASE DEFICIENCY: ICD-10-CM

## 2021-01-01 DIAGNOSIS — K52.9 INFLAMMATORY BOWEL DISEASE: Primary | ICD-10-CM

## 2021-01-01 DIAGNOSIS — E43 ALIMENTARY EDEMA: ICD-10-CM

## 2021-01-01 DIAGNOSIS — E27.0 OVERPRODUCTION OF ACTH: ICD-10-CM

## 2021-01-01 DIAGNOSIS — K52.9 INFLAMMATORY BOWEL DISEASE: ICD-10-CM

## 2021-01-01 DIAGNOSIS — L02.411 ABSCESS OF RIGHT AXILLA: ICD-10-CM

## 2021-01-01 DIAGNOSIS — E27.49 CORTICOSTERONE 18-MONOOXYGENASE DEFICIENCY: Primary | ICD-10-CM

## 2021-01-01 LAB
ALBUMIN SERPL BCP-MCNC: 2.3 G/DL (ref 3.5–5.2)
ALBUMIN SERPL BCP-MCNC: 2.3 G/DL (ref 3.5–5.2)
ALBUMIN SERPL BCP-MCNC: 2.4 G/DL (ref 3.5–5.2)
ALBUMIN SERPL BCP-MCNC: 2.5 G/DL (ref 3.5–5.2)
ALBUMIN SERPL BCP-MCNC: 2.7 G/DL (ref 3.5–5.2)
ALBUMIN SERPL BCP-MCNC: 2.8 G/DL (ref 3.5–5.2)
ALBUMIN SERPL BCP-MCNC: 3 G/DL (ref 3.5–5.2)
ALBUMIN SERPL BCP-MCNC: 3.5 G/DL (ref 3.5–5.2)
ALP SERPL-CCNC: 122 U/L (ref 55–135)
ALP SERPL-CCNC: 162 U/L (ref 55–135)
ALP SERPL-CCNC: 165 U/L (ref 55–135)
ALP SERPL-CCNC: 181 U/L (ref 55–135)
ALP SERPL-CCNC: 188 U/L (ref 55–135)
ALP SERPL-CCNC: 192 U/L (ref 55–135)
ALP SERPL-CCNC: 197 U/L (ref 55–135)
ALP SERPL-CCNC: 206 U/L (ref 55–135)
ALP SERPL-CCNC: 218 U/L (ref 55–135)
ALP SERPL-CCNC: 220 U/L (ref 55–135)
ALP SERPL-CCNC: 222 U/L (ref 55–135)
ALP SERPL-CCNC: 223 U/L (ref 55–135)
ALP SERPL-CCNC: 246 U/L (ref 55–135)
ALP SERPL-CCNC: 277 U/L (ref 55–135)
ALP SERPL-CCNC: 306 U/L (ref 55–135)
ALT SERPL W/O P-5'-P-CCNC: 107 U/L (ref 10–44)
ALT SERPL W/O P-5'-P-CCNC: 107 U/L (ref 10–44)
ALT SERPL W/O P-5'-P-CCNC: 114 U/L (ref 10–44)
ALT SERPL W/O P-5'-P-CCNC: 154 U/L (ref 10–44)
ALT SERPL W/O P-5'-P-CCNC: 214 U/L (ref 10–44)
ALT SERPL W/O P-5'-P-CCNC: 32 U/L (ref 10–44)
ALT SERPL W/O P-5'-P-CCNC: 34 U/L (ref 10–44)
ALT SERPL W/O P-5'-P-CCNC: 45 U/L (ref 10–44)
ALT SERPL W/O P-5'-P-CCNC: 56 U/L (ref 10–44)
ALT SERPL W/O P-5'-P-CCNC: 56 U/L (ref 10–44)
ALT SERPL W/O P-5'-P-CCNC: 63 U/L (ref 10–44)
ALT SERPL W/O P-5'-P-CCNC: 74 U/L (ref 10–44)
ALT SERPL W/O P-5'-P-CCNC: 81 U/L (ref 10–44)
ALT SERPL W/O P-5'-P-CCNC: 83 U/L (ref 10–44)
ALT SERPL W/O P-5'-P-CCNC: 84 U/L (ref 10–44)
ANION GAP SERPL CALC-SCNC: 10 MMOL/L (ref 8–16)
ANION GAP SERPL CALC-SCNC: 13 MMOL/L (ref 8–16)
ANION GAP SERPL CALC-SCNC: 6 MMOL/L (ref 8–16)
ANION GAP SERPL CALC-SCNC: 7 MMOL/L (ref 8–16)
ANION GAP SERPL CALC-SCNC: 8 MMOL/L (ref 8–16)
ANION GAP SERPL CALC-SCNC: 8 MMOL/L (ref 8–16)
ANION GAP SERPL CALC-SCNC: 9 MMOL/L (ref 8–16)
AST SERPL-CCNC: 100 U/L (ref 10–40)
AST SERPL-CCNC: 153 U/L (ref 10–40)
AST SERPL-CCNC: 23 U/L (ref 10–40)
AST SERPL-CCNC: 25 U/L (ref 10–40)
AST SERPL-CCNC: 28 U/L (ref 10–40)
AST SERPL-CCNC: 39 U/L (ref 10–40)
AST SERPL-CCNC: 43 U/L (ref 10–40)
AST SERPL-CCNC: 43 U/L (ref 10–40)
AST SERPL-CCNC: 49 U/L (ref 10–40)
AST SERPL-CCNC: 53 U/L (ref 10–40)
AST SERPL-CCNC: 55 U/L (ref 10–40)
AST SERPL-CCNC: 56 U/L (ref 10–40)
AST SERPL-CCNC: 58 U/L (ref 10–40)
AST SERPL-CCNC: 72 U/L (ref 10–40)
AST SERPL-CCNC: 80 U/L (ref 10–40)
BASOPHILS # BLD AUTO: 0.01 K/UL (ref 0–0.2)
BASOPHILS # BLD AUTO: 0.01 K/UL (ref 0–0.2)
BASOPHILS NFR BLD: 0.1 % (ref 0–1.9)
BASOPHILS NFR BLD: 0.1 % (ref 0–1.9)
BILIRUB SERPL-MCNC: 0.4 MG/DL (ref 0.1–1)
BILIRUB SERPL-MCNC: 0.5 MG/DL (ref 0.1–1)
BILIRUB SERPL-MCNC: 0.6 MG/DL (ref 0.1–1)
BILIRUB SERPL-MCNC: 0.8 MG/DL (ref 0.1–1)
BUN SERPL-MCNC: 10 MG/DL (ref 6–20)
BUN SERPL-MCNC: 10 MG/DL (ref 6–20)
BUN SERPL-MCNC: 12 MG/DL (ref 6–20)
BUN SERPL-MCNC: 13 MG/DL (ref 6–20)
BUN SERPL-MCNC: 13 MG/DL (ref 6–20)
BUN SERPL-MCNC: 14 MG/DL (ref 6–20)
BUN SERPL-MCNC: 14 MG/DL (ref 6–20)
BUN SERPL-MCNC: 15 MG/DL (ref 6–20)
BUN SERPL-MCNC: 16 MG/DL (ref 6–20)
BUN SERPL-MCNC: 19 MG/DL (ref 6–20)
BUN SERPL-MCNC: 22 MG/DL (ref 6–20)
BUN SERPL-MCNC: 28 MG/DL (ref 6–20)
BUN SERPL-MCNC: 31 MG/DL (ref 6–20)
BUN SERPL-MCNC: 6 MG/DL (ref 6–20)
CALCIUM SERPL-MCNC: 7 MG/DL (ref 8.7–10.5)
CALCIUM SERPL-MCNC: 7.1 MG/DL (ref 8.7–10.5)
CALCIUM SERPL-MCNC: 7.2 MG/DL (ref 8.7–10.5)
CALCIUM SERPL-MCNC: 7.3 MG/DL (ref 8.7–10.5)
CALCIUM SERPL-MCNC: 7.3 MG/DL (ref 8.7–10.5)
CALCIUM SERPL-MCNC: 7.5 MG/DL (ref 8.7–10.5)
CALCIUM SERPL-MCNC: 7.5 MG/DL (ref 8.7–10.5)
CALCIUM SERPL-MCNC: 7.6 MG/DL (ref 8.7–10.5)
CALCIUM SERPL-MCNC: 7.7 MG/DL (ref 8.7–10.5)
CALCIUM SERPL-MCNC: 7.8 MG/DL (ref 8.7–10.5)
CALCIUM SERPL-MCNC: 7.8 MG/DL (ref 8.7–10.5)
CALCIUM SERPL-MCNC: 7.9 MG/DL (ref 8.7–10.5)
CALCIUM SERPL-MCNC: 7.9 MG/DL (ref 8.7–10.5)
CALCIUM SERPL-MCNC: 8.1 MG/DL (ref 8.7–10.5)
CALCIUM SERPL-MCNC: 8.3 MG/DL (ref 8.7–10.5)
CHLORIDE SERPL-SCNC: 102 MMOL/L (ref 95–110)
CHLORIDE SERPL-SCNC: 107 MMOL/L (ref 95–110)
CHLORIDE SERPL-SCNC: 107 MMOL/L (ref 95–110)
CHLORIDE SERPL-SCNC: 109 MMOL/L (ref 95–110)
CHLORIDE SERPL-SCNC: 110 MMOL/L (ref 95–110)
CHLORIDE SERPL-SCNC: 110 MMOL/L (ref 95–110)
CHLORIDE SERPL-SCNC: 111 MMOL/L (ref 95–110)
CHLORIDE SERPL-SCNC: 113 MMOL/L (ref 95–110)
CHLORIDE SERPL-SCNC: 113 MMOL/L (ref 95–110)
CHLORIDE SERPL-SCNC: 114 MMOL/L (ref 95–110)
CHLORIDE SERPL-SCNC: 116 MMOL/L (ref 95–110)
CHLORIDE SERPL-SCNC: 116 MMOL/L (ref 95–110)
CHLORIDE SERPL-SCNC: 118 MMOL/L (ref 95–110)
CHLORIDE SERPL-SCNC: 119 MMOL/L (ref 95–110)
CHLORIDE SERPL-SCNC: 122 MMOL/L (ref 95–110)
CHOLEST SERPL-MCNC: 104 MG/DL (ref 120–199)
CHOLEST/HDLC SERPL: 2.7 {RATIO} (ref 2–5)
CO2 SERPL-SCNC: 11 MMOL/L (ref 23–29)
CO2 SERPL-SCNC: 14 MMOL/L (ref 23–29)
CO2 SERPL-SCNC: 16 MMOL/L (ref 23–29)
CO2 SERPL-SCNC: 17 MMOL/L (ref 23–29)
CO2 SERPL-SCNC: 18 MMOL/L (ref 23–29)
CO2 SERPL-SCNC: 19 MMOL/L (ref 23–29)
CO2 SERPL-SCNC: 20 MMOL/L (ref 23–29)
CO2 SERPL-SCNC: 22 MMOL/L (ref 23–29)
CO2 SERPL-SCNC: 23 MMOL/L (ref 23–29)
CO2 SERPL-SCNC: 23 MMOL/L (ref 23–29)
CO2 SERPL-SCNC: 24 MMOL/L (ref 23–29)
CREAT SERPL-MCNC: 0.4 MG/DL (ref 0.5–1.4)
CREAT SERPL-MCNC: 0.5 MG/DL (ref 0.5–1.4)
CREAT SERPL-MCNC: 0.7 MG/DL (ref 0.5–1.4)
CRP SERPL-MCNC: 1.2 MG/L (ref 0–8.2)
DIFFERENTIAL METHOD: ABNORMAL
DIFFERENTIAL METHOD: ABNORMAL
EOSINOPHIL # BLD AUTO: 0 K/UL (ref 0–0.5)
EOSINOPHIL # BLD AUTO: 0 K/UL (ref 0–0.5)
EOSINOPHIL NFR BLD: 0 % (ref 0–8)
EOSINOPHIL NFR BLD: 0 % (ref 0–8)
ERYTHROCYTE [DISTWIDTH] IN BLOOD BY AUTOMATED COUNT: 15.9 % (ref 11.5–14.5)
ERYTHROCYTE [DISTWIDTH] IN BLOOD BY AUTOMATED COUNT: 16.1 % (ref 11.5–14.5)
ERYTHROCYTE [DISTWIDTH] IN BLOOD BY AUTOMATED COUNT: 16.1 % (ref 11.5–14.5)
ERYTHROCYTE [DISTWIDTH] IN BLOOD BY AUTOMATED COUNT: 16.6 % (ref 11.5–14.5)
ERYTHROCYTE [DISTWIDTH] IN BLOOD BY AUTOMATED COUNT: 16.9 % (ref 11.5–14.5)
ERYTHROCYTE [DISTWIDTH] IN BLOOD BY AUTOMATED COUNT: 18.3 % (ref 11.5–14.5)
ERYTHROCYTE [DISTWIDTH] IN BLOOD BY AUTOMATED COUNT: 19.2 % (ref 11.5–14.5)
ERYTHROCYTE [DISTWIDTH] IN BLOOD BY AUTOMATED COUNT: 20.3 % (ref 11.5–14.5)
ERYTHROCYTE [DISTWIDTH] IN BLOOD BY AUTOMATED COUNT: 20.8 % (ref 11.5–14.5)
ERYTHROCYTE [DISTWIDTH] IN BLOOD BY AUTOMATED COUNT: 21.1 % (ref 11.5–14.5)
ERYTHROCYTE [DISTWIDTH] IN BLOOD BY AUTOMATED COUNT: 21.6 % (ref 11.5–14.5)
ERYTHROCYTE [DISTWIDTH] IN BLOOD BY AUTOMATED COUNT: 23.3 % (ref 11.5–14.5)
ERYTHROCYTE [SEDIMENTATION RATE] IN BLOOD BY WESTERGREN METHOD: <2 MM/HR (ref 0–36)
EST. GFR  (AFRICAN AMERICAN): >60 ML/MIN/1.73 M^2
EST. GFR  (NON AFRICAN AMERICAN): >60 ML/MIN/1.73 M^2
GLUCOSE SERPL-MCNC: 101 MG/DL (ref 70–110)
GLUCOSE SERPL-MCNC: 108 MG/DL (ref 70–110)
GLUCOSE SERPL-MCNC: 108 MG/DL (ref 70–110)
GLUCOSE SERPL-MCNC: 115 MG/DL (ref 70–110)
GLUCOSE SERPL-MCNC: 159 MG/DL (ref 70–110)
GLUCOSE SERPL-MCNC: 57 MG/DL (ref 70–110)
GLUCOSE SERPL-MCNC: 60 MG/DL (ref 70–110)
GLUCOSE SERPL-MCNC: 61 MG/DL (ref 70–110)
GLUCOSE SERPL-MCNC: 63 MG/DL (ref 70–110)
GLUCOSE SERPL-MCNC: 68 MG/DL (ref 70–110)
GLUCOSE SERPL-MCNC: 69 MG/DL (ref 70–110)
GLUCOSE SERPL-MCNC: 70 MG/DL (ref 70–110)
GLUCOSE SERPL-MCNC: 73 MG/DL (ref 70–110)
GLUCOSE SERPL-MCNC: 77 MG/DL (ref 70–110)
GLUCOSE SERPL-MCNC: 79 MG/DL (ref 70–110)
GLUCOSE SERPL-MCNC: 80 MG/DL (ref 70–110)
GLUCOSE SERPL-MCNC: 80 MG/DL (ref 70–110)
GLUCOSE SERPL-MCNC: 85 MG/DL (ref 70–110)
GLUCOSE SERPL-MCNC: 91 MG/DL (ref 70–110)
HCT VFR BLD AUTO: 23.5 % (ref 37–48.5)
HCT VFR BLD AUTO: 29 % (ref 37–48.5)
HCT VFR BLD AUTO: 29.9 % (ref 37–48.5)
HCT VFR BLD AUTO: 32.3 % (ref 37–48.5)
HCT VFR BLD AUTO: 33.2 % (ref 37–48.5)
HCT VFR BLD AUTO: 33.6 % (ref 37–48.5)
HCT VFR BLD AUTO: 34.2 % (ref 37–48.5)
HCT VFR BLD AUTO: 35.5 % (ref 37–48.5)
HCT VFR BLD AUTO: 35.6 % (ref 37–48.5)
HCT VFR BLD AUTO: 35.8 % (ref 37–48.5)
HCT VFR BLD AUTO: 36.3 % (ref 37–48.5)
HCT VFR BLD AUTO: 38.1 % (ref 37–48.5)
HDLC SERPL-MCNC: 39 MG/DL (ref 40–75)
HDLC SERPL: 37.5 % (ref 20–50)
HGB BLD-MCNC: 10.2 G/DL (ref 12–16)
HGB BLD-MCNC: 10.7 G/DL (ref 12–16)
HGB BLD-MCNC: 10.7 G/DL (ref 12–16)
HGB BLD-MCNC: 10.9 G/DL (ref 12–16)
HGB BLD-MCNC: 11 G/DL (ref 12–16)
HGB BLD-MCNC: 11.2 G/DL (ref 12–16)
HGB BLD-MCNC: 11.4 G/DL (ref 12–16)
HGB BLD-MCNC: 11.4 G/DL (ref 12–16)
HGB BLD-MCNC: 11.7 G/DL (ref 12–16)
HGB BLD-MCNC: 7.2 G/DL (ref 12–16)
HGB BLD-MCNC: 9.1 G/DL (ref 12–16)
HGB BLD-MCNC: 9.9 G/DL (ref 12–16)
IMM GRANULOCYTES # BLD AUTO: 0.08 K/UL (ref 0–0.04)
IMM GRANULOCYTES # BLD AUTO: 0.08 K/UL (ref 0–0.04)
IMM GRANULOCYTES NFR BLD AUTO: 0.6 % (ref 0–0.5)
IMM GRANULOCYTES NFR BLD AUTO: 0.6 % (ref 0–0.5)
LDLC SERPL CALC-MCNC: 53 MG/DL (ref 63–159)
LIPASE SERPL-CCNC: 43 U/L (ref 4–60)
LIPASE SERPL-CCNC: 74 U/L (ref 4–60)
LYMPHOCYTES # BLD AUTO: 0.4 K/UL (ref 1–4.8)
LYMPHOCYTES # BLD AUTO: 0.7 K/UL (ref 1–4.8)
LYMPHOCYTES NFR BLD: 3.2 % (ref 18–48)
LYMPHOCYTES NFR BLD: 5.4 % (ref 18–48)
MAGNESIUM SERPL-MCNC: 1.5 MG/DL (ref 1.6–2.6)
MAGNESIUM SERPL-MCNC: 1.6 MG/DL (ref 1.6–2.6)
MAGNESIUM SERPL-MCNC: 1.7 MG/DL (ref 1.6–2.6)
MAGNESIUM SERPL-MCNC: 1.8 MG/DL (ref 1.6–2.6)
MAGNESIUM SERPL-MCNC: 1.9 MG/DL (ref 1.6–2.6)
MAGNESIUM SERPL-MCNC: 2 MG/DL (ref 1.6–2.6)
MCH RBC QN AUTO: 27.6 PG (ref 27–31)
MCH RBC QN AUTO: 27.9 PG (ref 27–31)
MCH RBC QN AUTO: 29.8 PG (ref 27–31)
MCH RBC QN AUTO: 30 PG (ref 27–31)
MCH RBC QN AUTO: 30 PG (ref 27–31)
MCH RBC QN AUTO: 30.3 PG (ref 27–31)
MCH RBC QN AUTO: 31.5 PG (ref 27–31)
MCH RBC QN AUTO: 31.6 PG (ref 27–31)
MCH RBC QN AUTO: 32 PG (ref 27–31)
MCH RBC QN AUTO: 32.4 PG (ref 27–31)
MCH RBC QN AUTO: 32.6 PG (ref 27–31)
MCH RBC QN AUTO: 32.7 PG (ref 27–31)
MCHC RBC AUTO-ENTMCNC: 30.6 G/DL (ref 32–36)
MCHC RBC AUTO-ENTMCNC: 30.7 G/DL (ref 32–36)
MCHC RBC AUTO-ENTMCNC: 30.9 G/DL (ref 32–36)
MCHC RBC AUTO-ENTMCNC: 30.9 G/DL (ref 32–36)
MCHC RBC AUTO-ENTMCNC: 31.4 G/DL (ref 32–36)
MCHC RBC AUTO-ENTMCNC: 31.6 G/DL (ref 32–36)
MCHC RBC AUTO-ENTMCNC: 31.8 G/DL (ref 32–36)
MCHC RBC AUTO-ENTMCNC: 31.8 G/DL (ref 32–36)
MCHC RBC AUTO-ENTMCNC: 31.9 G/DL (ref 32–36)
MCHC RBC AUTO-ENTMCNC: 32.1 G/DL (ref 32–36)
MCHC RBC AUTO-ENTMCNC: 32.2 G/DL (ref 32–36)
MCHC RBC AUTO-ENTMCNC: 33.1 G/DL (ref 32–36)
MCV RBC AUTO: 100 FL (ref 82–98)
MCV RBC AUTO: 101 FL (ref 82–98)
MCV RBC AUTO: 102 FL (ref 82–98)
MCV RBC AUTO: 103 FL (ref 82–98)
MCV RBC AUTO: 106 FL (ref 82–98)
MCV RBC AUTO: 90 FL (ref 82–98)
MCV RBC AUTO: 90 FL (ref 82–98)
MCV RBC AUTO: 91 FL (ref 82–98)
MCV RBC AUTO: 94 FL (ref 82–98)
MCV RBC AUTO: 95 FL (ref 82–98)
MCV RBC AUTO: 96 FL (ref 82–98)
MCV RBC AUTO: 98 FL (ref 82–98)
MONOCYTES # BLD AUTO: 0.5 K/UL (ref 0.3–1)
MONOCYTES # BLD AUTO: 0.7 K/UL (ref 0.3–1)
MONOCYTES NFR BLD: 3.7 % (ref 4–15)
MONOCYTES NFR BLD: 4.8 % (ref 4–15)
NEUTROPHILS # BLD AUTO: 12.1 K/UL (ref 1.8–7.7)
NEUTROPHILS # BLD AUTO: 12.3 K/UL (ref 1.8–7.7)
NEUTROPHILS NFR BLD: 89.1 % (ref 38–73)
NEUTROPHILS NFR BLD: 92.4 % (ref 38–73)
NONHDLC SERPL-MCNC: 65 MG/DL
NRBC BLD-RTO: 0 /100 WBC
NRBC BLD-RTO: 0 /100 WBC
PLATELET # BLD AUTO: 135 K/UL (ref 150–450)
PLATELET # BLD AUTO: 135 K/UL (ref 150–450)
PLATELET # BLD AUTO: 173 K/UL (ref 150–450)
PLATELET # BLD AUTO: 174 K/UL (ref 150–450)
PLATELET # BLD AUTO: 184 K/UL (ref 150–450)
PLATELET # BLD AUTO: 200 K/UL (ref 150–450)
PLATELET # BLD AUTO: 203 K/UL (ref 150–450)
PLATELET # BLD AUTO: 206 K/UL (ref 150–450)
PLATELET # BLD AUTO: 213 K/UL (ref 150–450)
PLATELET # BLD AUTO: 220 K/UL (ref 150–450)
PLATELET # BLD AUTO: 278 K/UL (ref 150–350)
PLATELET # BLD AUTO: 314 K/UL (ref 150–350)
PMV BLD AUTO: 11 FL (ref 9.2–12.9)
PMV BLD AUTO: 11.8 FL (ref 9.2–12.9)
PMV BLD AUTO: 12.7 FL (ref 9.2–12.9)
PMV BLD AUTO: 13 FL (ref 9.2–12.9)
PMV BLD AUTO: 13.1 FL (ref 9.2–12.9)
PMV BLD AUTO: 13.3 FL (ref 9.2–12.9)
PMV BLD AUTO: 13.4 FL (ref 9.2–12.9)
PMV BLD AUTO: 13.8 FL (ref 9.2–12.9)
POTASSIUM SERPL-SCNC: 2.8 MMOL/L (ref 3.5–5.1)
POTASSIUM SERPL-SCNC: 3.2 MMOL/L (ref 3.5–5.1)
POTASSIUM SERPL-SCNC: 3.4 MMOL/L (ref 3.5–5.1)
POTASSIUM SERPL-SCNC: 3.5 MMOL/L (ref 3.5–5.1)
POTASSIUM SERPL-SCNC: 3.5 MMOL/L (ref 3.5–5.1)
POTASSIUM SERPL-SCNC: 3.6 MMOL/L (ref 3.5–5.1)
POTASSIUM SERPL-SCNC: 3.6 MMOL/L (ref 3.5–5.1)
POTASSIUM SERPL-SCNC: 3.7 MMOL/L (ref 3.5–5.1)
POTASSIUM SERPL-SCNC: 3.7 MMOL/L (ref 3.5–5.1)
POTASSIUM SERPL-SCNC: 3.8 MMOL/L (ref 3.5–5.1)
POTASSIUM SERPL-SCNC: 3.9 MMOL/L (ref 3.5–5.1)
POTASSIUM SERPL-SCNC: 4.1 MMOL/L (ref 3.5–5.1)
POTASSIUM SERPL-SCNC: 4.3 MMOL/L (ref 3.5–5.1)
POTASSIUM SERPL-SCNC: 4.3 MMOL/L (ref 3.5–5.1)
POTASSIUM SERPL-SCNC: 4.4 MMOL/L (ref 3.5–5.1)
POTASSIUM SERPL-SCNC: 4.6 MMOL/L (ref 3.5–5.1)
POTASSIUM SERPL-SCNC: 4.7 MMOL/L (ref 3.5–5.1)
POTASSIUM SERPL-SCNC: 4.9 MMOL/L (ref 3.5–5.1)
POTASSIUM SERPL-SCNC: 5.1 MMOL/L (ref 3.5–5.1)
PREALB SERPL-MCNC: 11 MG/DL (ref 20–43)
PREALB SERPL-MCNC: 11 MG/DL (ref 20–43)
PREALB SERPL-MCNC: 19 MG/DL (ref 20–43)
PROT SERPL-MCNC: 3.8 G/DL (ref 6–8.4)
PROT SERPL-MCNC: 4 G/DL (ref 6–8.4)
PROT SERPL-MCNC: 4.3 G/DL (ref 6–8.4)
PROT SERPL-MCNC: 4.4 G/DL (ref 6–8.4)
PROT SERPL-MCNC: 4.5 G/DL (ref 6–8.4)
PROT SERPL-MCNC: 4.5 G/DL (ref 6–8.4)
PROT SERPL-MCNC: 4.6 G/DL (ref 6–8.4)
PROT SERPL-MCNC: 4.6 G/DL (ref 6–8.4)
PROT SERPL-MCNC: 4.7 G/DL (ref 6–8.4)
PROT SERPL-MCNC: 4.8 G/DL (ref 6–8.4)
PROT SERPL-MCNC: 4.8 G/DL (ref 6–8.4)
PROT SERPL-MCNC: 4.9 G/DL (ref 6–8.4)
PROT SERPL-MCNC: 5.3 G/DL (ref 6–8.4)
RBC # BLD AUTO: 2.22 M/UL (ref 4–5.4)
RBC # BLD AUTO: 3.03 M/UL (ref 4–5.4)
RBC # BLD AUTO: 3.27 M/UL (ref 4–5.4)
RBC # BLD AUTO: 3.27 M/UL (ref 4–5.4)
RBC # BLD AUTO: 3.39 M/UL (ref 4–5.4)
RBC # BLD AUTO: 3.4 M/UL (ref 4–5.4)
RBC # BLD AUTO: 3.41 M/UL (ref 4–5.4)
RBC # BLD AUTO: 3.49 M/UL (ref 4–5.4)
RBC # BLD AUTO: 3.5 M/UL (ref 4–5.4)
RBC # BLD AUTO: 3.83 M/UL (ref 4–5.4)
RBC # BLD AUTO: 4.02 M/UL (ref 4–5.4)
RBC # BLD AUTO: 4.24 M/UL (ref 4–5.4)
SODIUM SERPL-SCNC: 131 MMOL/L (ref 136–145)
SODIUM SERPL-SCNC: 137 MMOL/L (ref 136–145)
SODIUM SERPL-SCNC: 138 MMOL/L (ref 136–145)
SODIUM SERPL-SCNC: 139 MMOL/L (ref 136–145)
SODIUM SERPL-SCNC: 139 MMOL/L (ref 136–145)
SODIUM SERPL-SCNC: 140 MMOL/L (ref 136–145)
SODIUM SERPL-SCNC: 141 MMOL/L (ref 136–145)
SODIUM SERPL-SCNC: 142 MMOL/L (ref 136–145)
SODIUM SERPL-SCNC: 142 MMOL/L (ref 136–145)
SODIUM SERPL-SCNC: 146 MMOL/L (ref 136–145)
TRIGL SERPL-MCNC: 60 MG/DL (ref 30–150)
WBC # BLD AUTO: 13.27 K/UL (ref 3.9–12.7)
WBC # BLD AUTO: 13.56 K/UL (ref 3.9–12.7)
WBC # BLD AUTO: 5.07 K/UL (ref 3.9–12.7)
WBC # BLD AUTO: 5.52 K/UL (ref 3.9–12.7)
WBC # BLD AUTO: 5.62 K/UL (ref 3.9–12.7)
WBC # BLD AUTO: 5.76 K/UL (ref 3.9–12.7)
WBC # BLD AUTO: 6.23 K/UL (ref 3.9–12.7)
WBC # BLD AUTO: 6.75 K/UL (ref 3.9–12.7)
WBC # BLD AUTO: 7.01 K/UL (ref 3.9–12.7)
WBC # BLD AUTO: 7.74 K/UL (ref 3.9–12.7)
WBC # BLD AUTO: 8.03 K/UL (ref 3.9–12.7)
WBC # BLD AUTO: 8.88 K/UL (ref 3.9–12.7)

## 2021-01-01 PROCEDURE — 85027 COMPLETE CBC AUTOMATED: CPT | Performed by: INTERNAL MEDICINE

## 2021-01-01 PROCEDURE — 80053 COMPREHEN METABOLIC PANEL: CPT | Performed by: INTERNAL MEDICINE

## 2021-01-01 PROCEDURE — 86140 C-REACTIVE PROTEIN: CPT

## 2021-01-01 PROCEDURE — 83735 ASSAY OF MAGNESIUM: CPT | Performed by: INTERNAL MEDICINE

## 2021-01-01 PROCEDURE — 85652 RBC SED RATE AUTOMATED: CPT

## 2021-01-01 PROCEDURE — 80053 COMPREHEN METABOLIC PANEL: CPT

## 2021-01-01 PROCEDURE — 83690 ASSAY OF LIPASE: CPT | Performed by: INTERNAL MEDICINE

## 2021-01-01 PROCEDURE — 80048 BASIC METABOLIC PNL TOTAL CA: CPT | Performed by: INTERNAL MEDICINE

## 2021-01-01 PROCEDURE — 84134 ASSAY OF PREALBUMIN: CPT

## 2021-01-01 PROCEDURE — 85025 COMPLETE CBC W/AUTO DIFF WBC: CPT

## 2021-01-01 PROCEDURE — 80061 LIPID PANEL: CPT | Performed by: INTERNAL MEDICINE

## 2021-01-01 PROCEDURE — 83735 ASSAY OF MAGNESIUM: CPT

## 2021-03-08 PROBLEM — D64.9 ANEMIA: Status: ACTIVE | Noted: 2021-01-01

## 2021-03-08 PROBLEM — K52.9 COLITIS: Status: ACTIVE | Noted: 2021-01-01

## 2021-03-09 PROBLEM — E43 SEVERE MALNUTRITION: Status: ACTIVE | Noted: 2021-01-01

## 2021-03-10 PROBLEM — K56.7 ILEUS, UNSPECIFIED: Status: ACTIVE | Noted: 2021-01-01

## 2021-03-10 PROBLEM — E27.49 IATROGENIC ADRENAL INSUFFICIENCY: Status: ACTIVE | Noted: 2021-01-01

## 2021-03-11 PROBLEM — K59.04 FUNCTIONAL CONSTIPATION: Status: ACTIVE | Noted: 2021-01-01

## 2021-03-12 PROBLEM — Z87.81: Status: ACTIVE | Noted: 2021-01-01

## 2021-05-24 PROBLEM — F32.A ANXIETY AND DEPRESSION: Status: ACTIVE | Noted: 2018-12-04

## 2021-05-24 PROBLEM — K90.0 CELIAC DISEASE: Status: ACTIVE | Noted: 2021-01-01

## 2021-05-24 PROBLEM — G89.29 OTHER CHRONIC PAIN: Status: ACTIVE | Noted: 2021-01-01

## 2021-05-24 PROBLEM — E27.40 ADRENAL INSUFFICIENCY: Status: ACTIVE | Noted: 2021-01-01

## 2021-10-05 PROBLEM — E87.1 HYPONATREMIA: Status: ACTIVE | Noted: 2021-01-01

## 2021-10-05 PROBLEM — L03.211 FACIAL CELLULITIS: Status: ACTIVE | Noted: 2021-01-01

## 2021-10-05 PROBLEM — Z71.89 ACP (ADVANCE CARE PLANNING): Status: ACTIVE | Noted: 2021-01-01

## 2021-10-06 PROBLEM — B96.1 BACTEREMIA DUE TO KLEBSIELLA PNEUMONIAE: Status: ACTIVE | Noted: 2021-01-01

## 2021-10-06 PROBLEM — R78.81 BACTEREMIA DUE TO KLEBSIELLA PNEUMONIAE: Status: ACTIVE | Noted: 2021-01-01

## 2021-10-25 PROBLEM — L02.419 ABSCESS OF ARM: Status: ACTIVE | Noted: 2021-01-01

## 2021-10-25 PROBLEM — R53.1 WEAKNESS: Status: ACTIVE | Noted: 2021-01-01

## 2021-10-30 PROBLEM — K92.2 GI BLEEDING: Status: ACTIVE | Noted: 2021-01-01

## 2021-11-17 PROBLEM — R29.898 RIGIDITY: Status: ACTIVE | Noted: 2021-01-01

## 2021-11-17 PROBLEM — Z66 DNR (DO NOT RESUSCITATE): Status: ACTIVE | Noted: 2021-01-01

## 2023-06-01 NOTE — PATIENT INSTRUCTIONS
Instructions:  - take bentyl 10 mg 30 min prior to lunch and again 30 min prior to dinner  - continue entocort 6 mg daily  - follow-up with Dr. Connors in 3 months    [facility] Pregnancy And Lactation Text: There is insufficient data to evaluate drug-associated risk for major birth defects, miscarriage, or other adverse maternal or fetal outcomes.  There is a pregnancy registry that monitors pregnancy outcomes in pregnant persons exposed to the medication during pregnancy.  It is unknown if this medication is excreted in breast milk.  Do not breastfeed during treatment and for about 4 weeks after the last dose.